# Patient Record
Sex: FEMALE | Race: BLACK OR AFRICAN AMERICAN | NOT HISPANIC OR LATINO | Employment: PART TIME | URBAN - METROPOLITAN AREA
[De-identification: names, ages, dates, MRNs, and addresses within clinical notes are randomized per-mention and may not be internally consistent; named-entity substitution may affect disease eponyms.]

---

## 2023-02-27 LAB
ALANINE AMINOTRANSFERASE (SGPT) (U/L) IN SER/PLAS: 11 U/L (ref 7–45)
ALBUMIN (G/DL) IN SER/PLAS: 4.4 G/DL (ref 3.4–5)
ALKALINE PHOSPHATASE (U/L) IN SER/PLAS: 57 U/L (ref 33–136)
ANION GAP IN SER/PLAS: 13 MMOL/L (ref 10–20)
ASPARTATE AMINOTRANSFERASE (SGOT) (U/L) IN SER/PLAS: 16 U/L (ref 9–39)
BASOPHILS (10*3/UL) IN BLOOD BY AUTOMATED COUNT: 0.04 X10E9/L (ref 0–0.1)
BASOPHILS/100 LEUKOCYTES IN BLOOD BY AUTOMATED COUNT: 0.6 % (ref 0–2)
BILIRUBIN TOTAL (MG/DL) IN SER/PLAS: 0.4 MG/DL (ref 0–1.2)
CALCIUM (MG/DL) IN SER/PLAS: 10.3 MG/DL (ref 8.6–10.6)
CARBON DIOXIDE, TOTAL (MMOL/L) IN SER/PLAS: 27 MMOL/L (ref 21–32)
CHLORIDE (MMOL/L) IN SER/PLAS: 106 MMOL/L (ref 98–107)
CREATININE (MG/DL) IN SER/PLAS: 0.84 MG/DL (ref 0.5–1.05)
EOSINOPHILS (10*3/UL) IN BLOOD BY AUTOMATED COUNT: 0.12 X10E9/L (ref 0–0.4)
EOSINOPHILS/100 LEUKOCYTES IN BLOOD BY AUTOMATED COUNT: 1.8 % (ref 0–6)
ERYTHROCYTE DISTRIBUTION WIDTH (RATIO) BY AUTOMATED COUNT: 14.7 % (ref 11.5–14.5)
ERYTHROCYTE MEAN CORPUSCULAR HEMOGLOBIN CONCENTRATION (G/DL) BY AUTOMATED: 32 G/DL (ref 32–36)
ERYTHROCYTE MEAN CORPUSCULAR VOLUME (FL) BY AUTOMATED COUNT: 89 FL (ref 80–100)
ERYTHROCYTES (10*6/UL) IN BLOOD BY AUTOMATED COUNT: 4.49 X10E12/L (ref 4–5.2)
GFR FEMALE: 74 ML/MIN/1.73M2
GLUCOSE (MG/DL) IN SER/PLAS: 87 MG/DL (ref 74–99)
HEMATOCRIT (%) IN BLOOD BY AUTOMATED COUNT: 40 % (ref 36–46)
HEMOGLOBIN (G/DL) IN BLOOD: 12.8 G/DL (ref 12–16)
IMMATURE GRANULOCYTES/100 LEUKOCYTES IN BLOOD BY AUTOMATED COUNT: 0.3 % (ref 0–0.9)
LEUKOCYTES (10*3/UL) IN BLOOD BY AUTOMATED COUNT: 6.8 X10E9/L (ref 4.4–11.3)
LYMPHOCYTES (10*3/UL) IN BLOOD BY AUTOMATED COUNT: 1.42 X10E9/L (ref 0.8–3)
LYMPHOCYTES/100 LEUKOCYTES IN BLOOD BY AUTOMATED COUNT: 20.9 % (ref 13–44)
MONOCYTES (10*3/UL) IN BLOOD BY AUTOMATED COUNT: 0.46 X10E9/L (ref 0.05–0.8)
MONOCYTES/100 LEUKOCYTES IN BLOOD BY AUTOMATED COUNT: 6.8 % (ref 2–10)
NATRIURETIC PEPTIDE B (PG/ML) IN SER/PLAS: 30 PG/ML (ref 0–99)
NEUTROPHILS (10*3/UL) IN BLOOD BY AUTOMATED COUNT: 4.75 X10E9/L (ref 1.6–5.5)
NEUTROPHILS/100 LEUKOCYTES IN BLOOD BY AUTOMATED COUNT: 69.6 % (ref 40–80)
NRBC (PER 100 WBCS) BY AUTOMATED COUNT: 0 /100 WBC (ref 0–0)
PLATELETS (10*3/UL) IN BLOOD AUTOMATED COUNT: 235 X10E9/L (ref 150–450)
POTASSIUM (MMOL/L) IN SER/PLAS: 4.1 MMOL/L (ref 3.5–5.3)
PROTEIN TOTAL: 7.5 G/DL (ref 6.4–8.2)
SODIUM (MMOL/L) IN SER/PLAS: 142 MMOL/L (ref 136–145)
UREA NITROGEN (MG/DL) IN SER/PLAS: 13 MG/DL (ref 6–23)

## 2023-04-11 LAB
CHOLESTEROL (MG/DL) IN SER/PLAS: 157 MG/DL (ref 0–199)
CHOLESTEROL IN HDL (MG/DL) IN SER/PLAS: 42.7 MG/DL
CHOLESTEROL/HDL RATIO: 3.7
LDL: 94 MG/DL (ref 0–99)
TRIGLYCERIDE (MG/DL) IN SER/PLAS: 102 MG/DL (ref 0–149)
VLDL: 20 MG/DL (ref 0–40)

## 2023-05-17 ENCOUNTER — APPOINTMENT (OUTPATIENT)
Dept: LAB | Facility: LAB | Age: 73
End: 2023-05-17
Payer: MEDICARE

## 2023-05-17 LAB
ALANINE AMINOTRANSFERASE (SGPT) (U/L) IN SER/PLAS: 13 U/L (ref 7–45)
ALBUMIN (G/DL) IN SER/PLAS: 4.3 G/DL (ref 3.4–5)
ALKALINE PHOSPHATASE (U/L) IN SER/PLAS: 58 U/L (ref 33–136)
ANION GAP IN SER/PLAS: 13 MMOL/L (ref 10–20)
ASPARTATE AMINOTRANSFERASE (SGOT) (U/L) IN SER/PLAS: 17 U/L (ref 9–39)
BASOPHILS (10*3/UL) IN BLOOD BY AUTOMATED COUNT: 0.03 X10E9/L (ref 0–0.1)
BASOPHILS/100 LEUKOCYTES IN BLOOD BY AUTOMATED COUNT: 0.4 % (ref 0–2)
BILIRUBIN TOTAL (MG/DL) IN SER/PLAS: 0.3 MG/DL (ref 0–1.2)
CALCIUM (MG/DL) IN SER/PLAS: 9.5 MG/DL (ref 8.6–10.6)
CARBON DIOXIDE, TOTAL (MMOL/L) IN SER/PLAS: 27 MMOL/L (ref 21–32)
CHLORIDE (MMOL/L) IN SER/PLAS: 107 MMOL/L (ref 98–107)
CREATININE (MG/DL) IN SER/PLAS: 0.82 MG/DL (ref 0.5–1.05)
EOSINOPHILS (10*3/UL) IN BLOOD BY AUTOMATED COUNT: 0.16 X10E9/L (ref 0–0.4)
EOSINOPHILS/100 LEUKOCYTES IN BLOOD BY AUTOMATED COUNT: 2.3 % (ref 0–6)
ERYTHROCYTE DISTRIBUTION WIDTH (RATIO) BY AUTOMATED COUNT: 15.3 % (ref 11.5–14.5)
ERYTHROCYTE MEAN CORPUSCULAR HEMOGLOBIN CONCENTRATION (G/DL) BY AUTOMATED: 31.9 G/DL (ref 32–36)
ERYTHROCYTE MEAN CORPUSCULAR VOLUME (FL) BY AUTOMATED COUNT: 90 FL (ref 80–100)
ERYTHROCYTES (10*6/UL) IN BLOOD BY AUTOMATED COUNT: 4.32 X10E12/L (ref 4–5.2)
GFR FEMALE: 76 ML/MIN/1.73M2
GLUCOSE (MG/DL) IN SER/PLAS: 93 MG/DL (ref 74–99)
HEMATOCRIT (%) IN BLOOD BY AUTOMATED COUNT: 38.9 % (ref 36–46)
HEMOGLOBIN (G/DL) IN BLOOD: 12.4 G/DL (ref 12–16)
IMMATURE GRANULOCYTES/100 LEUKOCYTES IN BLOOD BY AUTOMATED COUNT: 0.3 % (ref 0–0.9)
LEUKOCYTES (10*3/UL) IN BLOOD BY AUTOMATED COUNT: 7 X10E9/L (ref 4.4–11.3)
LYMPHOCYTES (10*3/UL) IN BLOOD BY AUTOMATED COUNT: 1.74 X10E9/L (ref 0.8–3)
LYMPHOCYTES/100 LEUKOCYTES IN BLOOD BY AUTOMATED COUNT: 24.9 % (ref 13–44)
MONOCYTES (10*3/UL) IN BLOOD BY AUTOMATED COUNT: 0.43 X10E9/L (ref 0.05–0.8)
MONOCYTES/100 LEUKOCYTES IN BLOOD BY AUTOMATED COUNT: 6.2 % (ref 2–10)
NATRIURETIC PEPTIDE B (PG/ML) IN SER/PLAS: 24 PG/ML (ref 0–99)
NEUTROPHILS (10*3/UL) IN BLOOD BY AUTOMATED COUNT: 4.61 X10E9/L (ref 1.6–5.5)
NEUTROPHILS/100 LEUKOCYTES IN BLOOD BY AUTOMATED COUNT: 65.9 % (ref 40–80)
NRBC (PER 100 WBCS) BY AUTOMATED COUNT: 0 /100 WBC (ref 0–0)
PLATELETS (10*3/UL) IN BLOOD AUTOMATED COUNT: 229 X10E9/L (ref 150–450)
POTASSIUM (MMOL/L) IN SER/PLAS: 4 MMOL/L (ref 3.5–5.3)
PROTEIN TOTAL: 7.6 G/DL (ref 6.4–8.2)
SODIUM (MMOL/L) IN SER/PLAS: 143 MMOL/L (ref 136–145)
UREA NITROGEN (MG/DL) IN SER/PLAS: 15 MG/DL (ref 6–23)

## 2023-08-28 ENCOUNTER — LAB (OUTPATIENT)
Dept: LAB | Facility: LAB | Age: 73
End: 2023-08-28
Payer: MEDICARE

## 2023-08-28 LAB
ALANINE AMINOTRANSFERASE (SGPT) (U/L) IN SER/PLAS: 12 U/L (ref 7–45)
ALBUMIN (G/DL) IN SER/PLAS: 4.5 G/DL (ref 3.4–5)
ALKALINE PHOSPHATASE (U/L) IN SER/PLAS: 59 U/L (ref 33–136)
ANION GAP IN SER/PLAS: 13 MMOL/L (ref 10–20)
ASPARTATE AMINOTRANSFERASE (SGOT) (U/L) IN SER/PLAS: 17 U/L (ref 9–39)
BILIRUBIN TOTAL (MG/DL) IN SER/PLAS: 0.4 MG/DL (ref 0–1.2)
CALCIUM (MG/DL) IN SER/PLAS: 9.7 MG/DL (ref 8.6–10.6)
CARBON DIOXIDE, TOTAL (MMOL/L) IN SER/PLAS: 25 MMOL/L (ref 21–32)
CHLORIDE (MMOL/L) IN SER/PLAS: 106 MMOL/L (ref 98–107)
CREATININE (MG/DL) IN SER/PLAS: 0.8 MG/DL (ref 0.5–1.05)
ERYTHROCYTE DISTRIBUTION WIDTH (RATIO) BY AUTOMATED COUNT: 15.2 % (ref 11.5–14.5)
ERYTHROCYTE MEAN CORPUSCULAR HEMOGLOBIN CONCENTRATION (G/DL) BY AUTOMATED: 31.3 G/DL (ref 32–36)
ERYTHROCYTE MEAN CORPUSCULAR VOLUME (FL) BY AUTOMATED COUNT: 90 FL (ref 80–100)
ERYTHROCYTES (10*6/UL) IN BLOOD BY AUTOMATED COUNT: 4.47 X10E12/L (ref 4–5.2)
GFR FEMALE: 78 ML/MIN/1.73M2
GLUCOSE (MG/DL) IN SER/PLAS: 85 MG/DL (ref 74–99)
HEMATOCRIT (%) IN BLOOD BY AUTOMATED COUNT: 40.2 % (ref 36–46)
HEMOGLOBIN (G/DL) IN BLOOD: 12.6 G/DL (ref 12–16)
LEUKOCYTES (10*3/UL) IN BLOOD BY AUTOMATED COUNT: 6.8 X10E9/L (ref 4.4–11.3)
NATRIURETIC PEPTIDE B (PG/ML) IN SER/PLAS: 32 PG/ML (ref 0–99)
NRBC (PER 100 WBCS) BY AUTOMATED COUNT: 0 /100 WBC (ref 0–0)
PLATELETS (10*3/UL) IN BLOOD AUTOMATED COUNT: 225 X10E9/L (ref 150–450)
POTASSIUM (MMOL/L) IN SER/PLAS: 4.4 MMOL/L (ref 3.5–5.3)
PROTEIN TOTAL: 7.7 G/DL (ref 6.4–8.2)
SODIUM (MMOL/L) IN SER/PLAS: 140 MMOL/L (ref 136–145)
UREA NITROGEN (MG/DL) IN SER/PLAS: 12 MG/DL (ref 6–23)

## 2024-01-05 RX ORDER — UBIDECARENONE 30 MG
1 CAPSULE ORAL DAILY
COMMUNITY

## 2024-01-05 RX ORDER — ASPIRIN 81 MG/1
1 TABLET ORAL DAILY
COMMUNITY
Start: 2017-07-20

## 2024-01-05 RX ORDER — FLUTICASONE PROPIONATE 50 MCG
SPRAY, SUSPENSION (ML) NASAL
COMMUNITY

## 2024-01-05 RX ORDER — MACITENTAN 10 MG/1
1 TABLET, FILM COATED ORAL DAILY
COMMUNITY
Start: 2022-04-12

## 2024-01-05 RX ORDER — TADALAFIL 20 MG/1
20 TABLET ORAL
COMMUNITY

## 2024-01-05 RX ORDER — ASCORBIC ACID 250 MG
1 TABLET ORAL DAILY
COMMUNITY
Start: 2021-03-11

## 2024-01-05 RX ORDER — CLOPIDOGREL BISULFATE 75 MG/1
75 TABLET ORAL
COMMUNITY
End: 2024-01-29

## 2024-01-05 RX ORDER — LORATADINE 10 MG/1
1 TABLET ORAL DAILY PRN
COMMUNITY

## 2024-01-05 RX ORDER — PSEUDOEPHEDRINE HCL 120 MG/1
TABLET, FILM COATED, EXTENDED RELEASE ORAL EVERY 12 HOURS
COMMUNITY
Start: 2022-04-12

## 2024-01-05 RX ORDER — PANTOPRAZOLE SODIUM 40 MG/1
40 TABLET, DELAYED RELEASE ORAL
COMMUNITY
End: 2024-03-11

## 2024-01-05 RX ORDER — UBIDECARENONE 75 MG
1 CAPSULE ORAL DAILY
COMMUNITY

## 2024-01-05 RX ORDER — PRAVASTATIN SODIUM 40 MG/1
40 TABLET ORAL
COMMUNITY
End: 2024-02-14 | Stop reason: SDUPTHER

## 2024-01-05 NOTE — PROGRESS NOTES
History Of Present Illness  Jessica Umaña is a 73 y.o. female presenting with  pulmonary arterial hypertension follow up. She was last seen in clinic 8/28/23. Patient was referred by Doctor Heidi Kraus in 2017. No new medical issues or admission since her last visit. Walking 60 minutes on treadmill 5 days a week.     PAH Treatment: Tadalafil, Opsumit, and ordered oxygen 3LPM at rest and at night.. Wearing 4LPM with ambulation.  No edema , no complaints, feels well   Infusion site: N/A  Treatment history: N/A    01/05/24 Testing today includes 6MWT and labs.     Past Medical History  There is no problem list on file for this patient.       Surgical History  She has a past surgical history that includes Hysterectomy (06/07/2017).     Social History  She has no history on file for tobacco use, alcohol use, and drug use.    Family History  No family history on file.     Medications      Current Outpatient Medications:     ascorbic acid (Vitamin C) 250 mg tablet, Take 1 tablet (250 mg) by mouth once daily., Disp: , Rfl:     aspirin 81 mg EC tablet, Take 1 tablet (81 mg) by mouth once daily., Disp: , Rfl:     Opsumit 10 mg tablet tablet, Take 1 tablet (10 mg) by mouth once daily., Disp: , Rfl:     pseudoephedrine ER (Sudafed 12 Hour) 120 mg 12 hr tablet, Take by mouth every 12 hours., Disp: , Rfl:     clopidogrel (Plavix) 75 mg tablet, Take 1 tablet (75 mg) by mouth once daily., Disp: , Rfl:     cyanocobalamin (Vitamin B-12) 500 mcg tablet, Take 1 tablet (500 mcg) by mouth once daily., Disp: , Rfl:     fluticasone (Flonase) 50 mcg/actuation nasal spray, Administer into affected nostril(s) once daily., Disp: , Rfl:     loratadine (Claritin) 10 mg tablet, Take 1 tablet (10 mg) by mouth once daily as needed., Disp: , Rfl:     mv-calcium-min-iron fm-FA-vitK (Multi For Her) 18 mg iron-600 mcg-80 mcg tablet, Take 1 tablet by mouth once daily., Disp: , Rfl:     pantoprazole (ProtoNix) 40 mg EC tablet, Take 1 tablet (40 mg) by  mouth once daily., Disp: , Rfl:     pravastatin (Pravachol) 40 mg tablet, Take 1 tablet (40 mg) by mouth once daily., Disp: , Rfl:     tadalafil 20 mg tablet, Take 1 tablet (20 mg) by mouth once daily., Disp: , Rfl:      Allergies  Patient has no allergy information on record.    Review of Systems   Constitutional:  Negative for appetite change and fatigue.   Respiratory:  Positive for shortness of breath. Negative for chest tightness.         SOB on exertion   Cardiovascular:  Negative for chest pain and leg swelling.   Neurological:  Negative for dizziness, syncope and light-headedness.   Psychiatric/Behavioral:  Negative for sleep disturbance.        Last Recorded Vitals  There were no vitals taken for this visit.     Physical Exam  Constitutional:       Comments: Wheelchair and oxygen.   HENT:      Head: Normocephalic.      Nose: Nose normal.      Mouth/Throat:      Mouth: Mucous membranes are moist.   Eyes:      Pupils: Pupils are equal, round, and reactive to light.   Cardiovascular:      Rate and Rhythm: Normal rate and regular rhythm.   Pulmonary:      Effort: Pulmonary effort is normal.      Breath sounds: Normal breath sounds.   Abdominal:      General: Bowel sounds are normal.      Palpations: Abdomen is soft.   Musculoskeletal:      Cervical back: Normal range of motion.      Right lower leg: No edema.      Left lower leg: No edema.   Skin:     Findings: No rash.   Neurological:      Mental Status: She is alert.   Psychiatric:         Mood and Affect: Mood normal.            Relevant Results      6MWT ( 2024)  SpO2: 95-84% 6LPM  HR:   More: 0-6  Actual meters: 448        Echo (2023)  The right ventricle is mildly enlarged. There is low normal right ventricular systolic function.  The right atrium is normal in size.      6MWT (2023)  SpO2: 97-88% 6LPM  HR:   More: 0-4  Actual meters: 442      6MWT (2023)  SpO2: 97-85% 6LPM  HR:   More: 0-5  Actual meters: 454     6MWT  "23  HR 64 - 125  Spo2 99 - 88 on 6LPM  More 0 - 5  Actual 428m / 393m predicted      Echo (23): normal size RV with normal function, normal size RA      RHC (2021)   PAp 41/16 (26)   PW 9   C.O. 6.5 / C.I. 3.5     Assessment/Plan     I have reviewed relevant data prior to patient visit includinMW and echo essentially unchanged compared to last.         1) Pulmonary   a. IPAH FC i - II on dual oral therapies. and improving and significant objective and subjective improvement. Walk distance Well over presentation and stable. Last echo  today with suboptimal RV views but likely unchaged from last..Cath 2018, mPAP =27 mm Hg. Feels \"wonderful\". Echo 2020 normal RV by my eye. Due for cath this year. Cath 2021 mPAP = 26 mm Hg. Echo 2023 with \"normal RV\", I disagree with that, at least mild + but stable, echo 2023 same. SpO2 on room air rest = 96%     2023 - Walk (+) 26m, desat to 85% on 6L/min    2024 - Walk stable , no change, no interval medical issues.     2) (+) GLADYS without evidence of systemic disease.     3) Endocrine  a. diabetes  b. Obesity , BMI =33.4 -34.9 -> 33.29      4) Cardiac - stented CAD with some benefit.       Plan     1) Oxygen at 5-6L/min with slow ambulation. Continue sleep   O2, no oxygen needed awake at rest.  2) Continue macitentan and Adcirca.  3) Follow up approximately 3-4 month follow up with 6 MW, cath in .   5) Weight loss.  6) Check CBC, chem complete, BNP to monitor disease progression and long term use of meds.             "

## 2024-01-17 ENCOUNTER — LAB (OUTPATIENT)
Dept: LAB | Facility: LAB | Age: 74
End: 2024-01-17
Payer: MEDICARE

## 2024-01-17 ENCOUNTER — OFFICE VISIT (OUTPATIENT)
Dept: PULMONOLOGY | Facility: HOSPITAL | Age: 74
End: 2024-01-17
Payer: MEDICARE

## 2024-01-17 ENCOUNTER — HOSPITAL ENCOUNTER (OUTPATIENT)
Dept: RESPIRATORY THERAPY | Facility: HOSPITAL | Age: 74
Discharge: HOME | End: 2024-01-17
Payer: MEDICARE

## 2024-01-17 VITALS
SYSTOLIC BLOOD PRESSURE: 120 MMHG | HEART RATE: 69 BPM | BODY MASS INDEX: 33.03 KG/M2 | OXYGEN SATURATION: 94 % | DIASTOLIC BLOOD PRESSURE: 79 MMHG | WEIGHT: 180.6 LBS

## 2024-01-17 DIAGNOSIS — R06.02 SHORTNESS OF BREATH: ICD-10-CM

## 2024-01-17 DIAGNOSIS — I27.20 PULMONARY HYPERTENSION (MULTI): ICD-10-CM

## 2024-01-17 LAB
ANION GAP SERPL CALC-SCNC: 13 MMOL/L (ref 10–20)
BASOPHILS # BLD AUTO: 0.03 X10*3/UL (ref 0–0.1)
BASOPHILS NFR BLD AUTO: 0.4 %
BNP SERPL-MCNC: 21 PG/ML (ref 0–99)
BUN SERPL-MCNC: 12 MG/DL (ref 6–23)
CALCIUM SERPL-MCNC: 10.3 MG/DL (ref 8.6–10.6)
CHLORIDE SERPL-SCNC: 105 MMOL/L (ref 98–107)
CO2 SERPL-SCNC: 28 MMOL/L (ref 21–32)
CREAT SERPL-MCNC: 0.9 MG/DL (ref 0.5–1.05)
EGFRCR SERPLBLD CKD-EPI 2021: 68 ML/MIN/1.73M*2
EOSINOPHIL # BLD AUTO: 0.14 X10*3/UL (ref 0–0.4)
EOSINOPHIL NFR BLD AUTO: 2 %
ERYTHROCYTE [DISTWIDTH] IN BLOOD BY AUTOMATED COUNT: 14.9 % (ref 11.5–14.5)
GLUCOSE SERPL-MCNC: 84 MG/DL (ref 74–99)
HCT VFR BLD AUTO: 41.2 % (ref 36–46)
HGB BLD-MCNC: 12.9 G/DL (ref 12–16)
IMM GRANULOCYTES # BLD AUTO: 0.01 X10*3/UL (ref 0–0.5)
IMM GRANULOCYTES NFR BLD AUTO: 0.1 % (ref 0–0.9)
LYMPHOCYTES # BLD AUTO: 1.53 X10*3/UL (ref 0.8–3)
LYMPHOCYTES NFR BLD AUTO: 21.6 %
MCH RBC QN AUTO: 28.1 PG (ref 26–34)
MCHC RBC AUTO-ENTMCNC: 31.3 G/DL (ref 32–36)
MCV RBC AUTO: 90 FL (ref 80–100)
MONOCYTES # BLD AUTO: 0.39 X10*3/UL (ref 0.05–0.8)
MONOCYTES NFR BLD AUTO: 5.5 %
NEUTROPHILS # BLD AUTO: 4.99 X10*3/UL (ref 1.6–5.5)
NEUTROPHILS NFR BLD AUTO: 70.4 %
NRBC BLD-RTO: 0 /100 WBCS (ref 0–0)
PLATELET # BLD AUTO: 245 X10*3/UL (ref 150–450)
POTASSIUM SERPL-SCNC: 4.3 MMOL/L (ref 3.5–5.3)
RBC # BLD AUTO: 4.59 X10*6/UL (ref 4–5.2)
SODIUM SERPL-SCNC: 142 MMOL/L (ref 136–145)
WBC # BLD AUTO: 7.1 X10*3/UL (ref 4.4–11.3)

## 2024-01-17 PROCEDURE — 80048 BASIC METABOLIC PNL TOTAL CA: CPT

## 2024-01-17 PROCEDURE — 83880 ASSAY OF NATRIURETIC PEPTIDE: CPT

## 2024-01-17 PROCEDURE — 1126F AMNT PAIN NOTED NONE PRSNT: CPT | Performed by: INTERNAL MEDICINE

## 2024-01-17 PROCEDURE — 85025 COMPLETE CBC W/AUTO DIFF WBC: CPT

## 2024-01-17 PROCEDURE — 36415 COLL VENOUS BLD VENIPUNCTURE: CPT

## 2024-01-17 PROCEDURE — 99215 OFFICE O/P EST HI 40 MIN: CPT | Performed by: INTERNAL MEDICINE

## 2024-01-17 PROCEDURE — 1159F MED LIST DOCD IN RCRD: CPT | Performed by: INTERNAL MEDICINE

## 2024-01-17 PROCEDURE — 94618 PULMONARY STRESS TESTING: CPT | Performed by: INTERNAL MEDICINE

## 2024-01-17 RX ORDER — EVOLOCUMAB 140 MG/ML
INJECTION, SOLUTION SUBCUTANEOUS
COMMUNITY
End: 2024-04-09 | Stop reason: ENTERED-IN-ERROR

## 2024-01-17 RX ORDER — CALCIUM CARBONATE 500(1250)
TABLET ORAL EVERY 24 HOURS
COMMUNITY

## 2024-01-17 ASSESSMENT — ENCOUNTER SYMPTOMS
SHORTNESS OF BREATH: 1
CHEST TIGHTNESS: 0
LIGHT-HEADEDNESS: 0
SLEEP DISTURBANCE: 0
FATIGUE: 0
DIZZINESS: 0
APPETITE CHANGE: 0

## 2024-01-28 DIAGNOSIS — I25.10 CORONARY ARTERY DISEASE INVOLVING NATIVE HEART WITHOUT ANGINA PECTORIS, UNSPECIFIED VESSEL OR LESION TYPE: Primary | ICD-10-CM

## 2024-01-29 RX ORDER — CLOPIDOGREL BISULFATE 75 MG/1
75 TABLET ORAL DAILY
Qty: 90 TABLET | Refills: 1 | Status: SHIPPED | OUTPATIENT
Start: 2024-01-29

## 2024-02-14 DIAGNOSIS — E78.5 HYPERLIPIDEMIA, UNSPECIFIED HYPERLIPIDEMIA TYPE: Primary | ICD-10-CM

## 2024-02-14 RX ORDER — PRAVASTATIN SODIUM 40 MG/1
40 TABLET ORAL NIGHTLY
Qty: 90 TABLET | Refills: 1 | Status: SHIPPED | OUTPATIENT
Start: 2024-02-14

## 2024-04-08 PROBLEM — I25.10 CAD (CORONARY ARTERY DISEASE): Status: ACTIVE | Noted: 2024-04-08

## 2024-04-09 ENCOUNTER — OFFICE VISIT (OUTPATIENT)
Dept: CARDIOLOGY | Facility: HOSPITAL | Age: 74
End: 2024-04-09
Payer: MEDICARE

## 2024-04-09 VITALS
DIASTOLIC BLOOD PRESSURE: 91 MMHG | HEIGHT: 62 IN | BODY MASS INDEX: 34.19 KG/M2 | WEIGHT: 185.8 LBS | SYSTOLIC BLOOD PRESSURE: 142 MMHG | OXYGEN SATURATION: 91 % | HEART RATE: 72 BPM

## 2024-04-09 DIAGNOSIS — I25.10 CORONARY ARTERY DISEASE INVOLVING NATIVE HEART, UNSPECIFIED VESSEL OR LESION TYPE, UNSPECIFIED WHETHER ANGINA PRESENT: Primary | ICD-10-CM

## 2024-04-09 PROCEDURE — 1159F MED LIST DOCD IN RCRD: CPT | Performed by: INTERNAL MEDICINE

## 2024-04-09 PROCEDURE — 1126F AMNT PAIN NOTED NONE PRSNT: CPT | Performed by: INTERNAL MEDICINE

## 2024-04-09 PROCEDURE — 1036F TOBACCO NON-USER: CPT | Performed by: INTERNAL MEDICINE

## 2024-04-09 PROCEDURE — 93010 ELECTROCARDIOGRAM REPORT: CPT | Performed by: INTERNAL MEDICINE

## 2024-04-09 PROCEDURE — 93005 ELECTROCARDIOGRAM TRACING: CPT | Performed by: INTERNAL MEDICINE

## 2024-04-09 PROCEDURE — 99214 OFFICE O/P EST MOD 30 MIN: CPT | Performed by: INTERNAL MEDICINE

## 2024-04-09 ASSESSMENT — PAIN SCALES - GENERAL: PAINLEVEL: 0-NO PAIN

## 2024-04-09 NOTE — PATIENT INSTRUCTIONS
To reach Dr. Mayer's office please call [phone] ([place]). Fax [phone]. Call [phone] to schedule an appointment. You may also contact the HF RNs at [email or web address]     Thank you for coming to your appointment today. If you have any questions or need cardiac medication refills, please call the Heart Failure Office at [phone] option [phone].   You may also contact the HF RNs at [email or web address]      Please check you blood pressure daily in the morning and afternoon and write these down. Please send these to us in 2-3 weeks.

## 2024-04-09 NOTE — PROGRESS NOTES
"Chief Complaint:   Annual Exam     History Of Present Illness:    Jessica Umaña is a 74 y.o. female presenting for a follow up visit     This is a 73 yr old AA female who presents for follow up of CAD and right heart dysfunction. She has a h/o Idiopathic pulmonary artery hypertension and CAD s/p LAD  PCI 7/28/17.   she continues to do well  She denies hospitalization in the last year.  She denies fatigue, chest pain, palpitations, orthopnea, PND. No edema noted in BLE.   Denies headaches, dizziness, falls.  On 4 Liters of nasal oxygen   Her most recent echocardiogram is listed below   She reports that her SBP is in the 120's at home     August 2023   1. Left ventricular systolic function is normal with a 55-60% estimated ejection fraction.  2. There is low normal right ventricular systolic function.    June 2021    1. Normal cardiac output (CO: 6.5 L/min, CI: 3.5 L/min/M2).   2. Normal fillin pressure (RA pressure 5 mmHG and PW pressure: 9 mmHG).   3. Mild pulmonary hypertension (PA pressure: 41/16 mmHG, mPAP: 26 mmHG).        Hospitalizations: Denies       Last Recorded Vitals:  Vitals:    04/09/24 0946   BP: (!) 142/91   BP Location: Left arm   Patient Position: Sitting   BP Cuff Size: Adult   Pulse: 72   SpO2: 91%   Weight: 84.3 kg (185 lb 12.8 oz)   Height: 1.575 m (5' 2\")       Past Medical History:  She has a past medical history of Personal history of other diseases of the circulatory system (06/07/2017) and Personal history of other diseases of the female genital tract (06/07/2017).    Past Surgical History:  She has a past surgical history that includes Hysterectomy (06/07/2017).      Social History:  She reports that she has never smoked. She has never used smokeless tobacco. She reports that she does not currently use alcohol. She reports that she does not currently use drugs.    Family History:  No family history on file.     Allergies:  Acetaminophen and Penicillins    Outpatient Medications:  Current " Outpatient Medications   Medication Instructions    ascorbic acid (Vitamin C) 250 mg tablet 1 tablet, oral, Daily    aspirin 81 mg EC tablet 1 tablet, oral, Daily    calcium carbonate (Oscal) 500 mg calcium (1,250 mg) tablet Every 24 hours    clopidogrel (PLAVIX) 75 mg, oral, Daily    cyanocobalamin (Vitamin B-12) 500 mcg tablet 1 tablet, oral, Daily    fluticasone (Flonase) 50 mcg/actuation nasal spray nasal, Daily RT    loratadine (Claritin) 10 mg tablet 1 tablet, oral, Daily PRN    mv-calcium-min-iron fm-FA-vitK (Multi For Her) 18 mg iron-600 mcg-80 mcg tablet 1 tablet, oral, Daily    Opsumit 10 mg tablet tablet 1 tablet, oral, Daily    pantoprazole (PROTONIX) 40 mg, oral, Daily    pravastatin (PRAVACHOL) 40 mg, oral, Nightly    pseudoephedrine ER (Sudafed 12 Hour) 120 mg 12 hr tablet oral, Every 12 hours    tadalafil (CIALIS) 20 mg, oral, Daily RT       Physical Exam:  GEN: NAD , AOX3  HEENT : JVP at the clavicle   Heart : regular rhythm , normal S1 and S2 , no murmurs   Lungs : clear , resonant , normal air entry bilaterally   Abdomen : soft , non tender   Ext: warm , well perfused , no edema   Neuro : grossly intact       Last Labs:  CBC -  Lab Results   Component Value Date    WBC 7.1 01/17/2024    HGB 12.9 01/17/2024    HCT 41.2 01/17/2024    MCV 90 01/17/2024     01/17/2024       CMP -  Lab Results   Component Value Date    CALCIUM 10.3 01/17/2024    PROT 7.7 08/28/2023    ALBUMIN 4.5 08/28/2023    AST 17 08/28/2023    ALT 12 08/28/2023    ALKPHOS 59 08/28/2023    BILITOT 0.4 08/28/2023       LIPID PANEL -   Lab Results   Component Value Date    CHOL 157 04/11/2023    TRIG 102 04/11/2023    HDL 42.7 04/11/2023    CHHDL 3.7 04/11/2023    LDLF 94 04/11/2023    VLDL 20 04/11/2023       RENAL FUNCTION PANEL -   Lab Results   Component Value Date    GLUCOSE 84 01/17/2024     01/17/2024    K 4.3 01/17/2024     01/17/2024    CO2 28 01/17/2024    ANIONGAP 13 01/17/2024    BUN 12 01/17/2024     CREATININE 0.90 01/17/2024    CALCIUM 10.3 01/17/2024    ALBUMIN 4.5 08/28/2023        Lab Results   Component Value Date    BNP 21 01/17/2024           Assessment/Plan   73 year old woman with a PMH significant for idiopathic PH , also with CAD s/p PCI to an LAD  in 2017 .   She is here for a routine follow up   stable EF at 55-60% with a low normal RV function   BP is elevated , advised her to check her BP readings twice a day for the next 2-3 weeks and will revie them to decide on the need to start her on antihypertensives      Continue ASA and Plavix lifelong per Dr Hillman        Intolerant to statins secondary to significant myalgias      Yearly echocardiograms and cardiology appointments

## 2024-04-15 LAB
ATRIAL RATE: 58 BPM
P AXIS: 49 DEGREES
P OFFSET: 194 MS
P ONSET: 135 MS
PR INTERVAL: 186 MS
Q ONSET: 228 MS
QRS COUNT: 9 BEATS
QRS DURATION: 80 MS
QT INTERVAL: 424 MS
QTC CALCULATION(BAZETT): 416 MS
QTC FREDERICIA: 419 MS
R AXIS: 70 DEGREES
T AXIS: -16 DEGREES
T OFFSET: 440 MS
VENTRICULAR RATE: 58 BPM

## 2024-04-23 NOTE — PROGRESS NOTES
History Of Present Illness  Jessica Umaña is a 74 y.o. female presenting with  pulmonary arterial hypertension follow up. She was last seen in clinic 1/17/24. Patient was referred by Doctor Heidi rKaus in 2017. No new medical issues or admission since her last visit. Walking 60 minutes on treadmill 5 days a week. Patient is NYHA Functional Class II and WHO Group 1.     PAH Treatment: Tadalafil, Opsumit, and ordered oxygen 3LPM at rest and at night.. Wearing 4LPM with ambulation.  No edema , no complaints, feels well   Infusion site: N/A  Treatment history: N/A        04/23/24     Interval History   Patient presents with son. To clinic. No complaints at this time. Is wearing 4L of O2 via NC. Denies D/N/V/edema/HA/diarrhea/flushing/neuropathy. Patient confirms taking macitentan and tadalafil as prescribed.     Past Medical History  Patient Active Problem List   Diagnosis    CAD (coronary artery disease)        Surgical History  She has a past surgical history that includes Hysterectomy (06/07/2017).     Social History  She reports that she has never smoked. She has never used smokeless tobacco. She reports that she does not currently use alcohol. She reports that she does not currently use drugs.    Family History  No family history on file.     Medications      Current Outpatient Medications:     ascorbic acid (Vitamin C) 250 mg tablet, Take 1 tablet (250 mg) by mouth once daily., Disp: , Rfl:     aspirin 81 mg EC tablet, Take 1 tablet (81 mg) by mouth once daily., Disp: , Rfl:     calcium carbonate (Oscal) 500 mg calcium (1,250 mg) tablet, once every 24 hours., Disp: , Rfl:     clopidogrel (Plavix) 75 mg tablet, TAKE 1 TABLET EVERY DAY, Disp: 90 tablet, Rfl: 1    cyanocobalamin (Vitamin B-12) 500 mcg tablet, Take 1 tablet (500 mcg) by mouth once daily., Disp: , Rfl:     fluticasone (Flonase) 50 mcg/actuation nasal spray, Administer into affected nostril(s) once daily., Disp: , Rfl:     loratadine (Claritin) 10 mg  tablet, Take 1 tablet (10 mg) by mouth once daily as needed., Disp: , Rfl:     mv-calcium-min-iron fm-FA-vitK (Multi For Her) 18 mg iron-600 mcg-80 mcg tablet, Take 1 tablet by mouth once daily., Disp: , Rfl:     Opsumit 10 mg tablet tablet, Take 1 tablet (10 mg) by mouth once daily., Disp: , Rfl:     pantoprazole (ProtoNix) 40 mg EC tablet, TAKE 1 TABLET EVERY DAY, Disp: 90 tablet, Rfl: 3    pravastatin (Pravachol) 40 mg tablet, TAKE 1 TABLET AT BEDTIME, Disp: 90 tablet, Rfl: 1    pseudoephedrine ER (Sudafed 12 Hour) 120 mg 12 hr tablet, Take by mouth every 12 hours., Disp: , Rfl:     tadalafil 20 mg tablet, Take 1 tablet (20 mg) by mouth once daily., Disp: , Rfl:      Allergies  Acetaminophen and Penicillins    Review of Systems   Constitutional:  Negative for appetite change, chills and fever.   Respiratory:  Negative for cough, chest tightness, shortness of breath and wheezing.    Cardiovascular:  Negative for chest pain, palpitations and leg swelling.   Gastrointestinal:  Negative for abdominal distention, abdominal pain, constipation, diarrhea, nausea and vomiting.   Neurological:  Negative for dizziness, syncope, weakness, light-headedness and headaches.       Last Recorded Vitals    Vitals:    04/25/24 1349   BP: 111/72   Pulse: 58   SpO2: 98%         Physical Exam   Constitutional:       Comments: Wheelchair and oxygen.   HENT:      Head: Normocephalic.      Nose: Nose normal.      Mouth/Throat:      Mouth: Mucous membranes are moist.   Eyes:      Pupils: Pupils are equal, round, and reactive to light.   Cardiovascular:      Rate and Rhythm: Normal rate and regular rhythm.   Pulmonary:      Effort: Pulmonary effort is normal.      Breath sounds: Normal breath sounds.   Abdominal:      General: Bowel sounds are normal.      Palpations: Abdomen is soft.   Musculoskeletal:      Cervical back: Normal range of motion.      Right lower leg: No edema.      Left lower leg: No edema.   Skin:     Findings: No rash.  "  Neurological:      Mental Status: She is alert.   Psychiatric:         Mood and Affect: Mood normal.     Relevant Results    6MWT (2024)  SpO2: 99/87 6L  HR: 55/129  More: 0/6  Actual meters: 459    6MWT ( 2024)  SpO2: 95-84% 6LPM  HR:   More: 0-6  Actual meters: 448    6MWT (2023)  SpO2: 97-88% 6LPM  HR:   More: 0-4  Actual meters: 442     6MWT (2023)  SpO2: 97-85% 6LPM  HR:   More: 0-5  Actual meters: 454     6MWT 23  HR 64 - 125  Spo2 99 - 88 on 6LPM  More 0 - 5  Actual 428m / 393m predicted      Echo (2024)      Echo (2023)   The right ventricle is mildly enlarged. There is low normal right ventricular systolic function.  The right atrium is normal in size.        Echo (23):   normal size RV with normal function,   normal size RA     RHC (2021)  PAp 41/16 (26)  PW 9  C.O. 6.5 / C.I. 3.5        Assessment/Plan   1) Pulmonary   a. IPAH FC i - II on dual oral therapies. and improving and significant objective and subjective improvement. Walk distance Well over presentation and stable. Last echo  today with suboptimal RV views but likely unchaged from last..Cath 2018, mPAP =27 mm Hg. Feels \"wonderful\". Echo 2020 normal RV by my eye. Due for cath this year. Cath 2021 mPAP = 26 mm Hg. Echo 2023 with \"normal RV\", I disagree with that, at least mild + but stable, echo 2023 same. SpO2 on room air rest = 98% 2024 echo RV still > normal by my eye. Walk and subjective function stable.     2023 - Walk (+) 26m, desat to 85% on 6L/min     2024 - Walk stable , no change, no interval medical issues.     2) (+) GLADYS without evidence of systemic disease.     3) Endocrine  a. diabetes  b. Obesity , BMI =33.4 -34.9 -> 33.29      4) Cardiac - stented CAD with some benefit.        Plan  1) Oxygen at 5-6L/min with slow ambulation. Continue sleep   O2, no oxygen needed awake at rest.  2) Continue macitentan and Adcirca.  3) Follow " up approximately 3-4 month follow up with 6 MW, cath before seen.   5) Weight loss.  6) Check CBC, chem complete, BNP to monitor disease progression and long term use of meds.

## 2024-04-25 ENCOUNTER — HOSPITAL ENCOUNTER (OUTPATIENT)
Dept: CARDIOLOGY | Facility: HOSPITAL | Age: 74
Discharge: HOME | End: 2024-04-25
Payer: MEDICARE

## 2024-04-25 ENCOUNTER — HOSPITAL ENCOUNTER (OUTPATIENT)
Dept: RESPIRATORY THERAPY | Facility: HOSPITAL | Age: 74
Discharge: HOME | End: 2024-04-25
Payer: MEDICARE

## 2024-04-25 ENCOUNTER — LAB (OUTPATIENT)
Dept: LAB | Facility: LAB | Age: 74
End: 2024-04-25
Payer: MEDICARE

## 2024-04-25 ENCOUNTER — OFFICE VISIT (OUTPATIENT)
Dept: PULMONOLOGY | Facility: HOSPITAL | Age: 74
End: 2024-04-25
Payer: MEDICARE

## 2024-04-25 VITALS
WEIGHT: 181 LBS | SYSTOLIC BLOOD PRESSURE: 111 MMHG | OXYGEN SATURATION: 98 % | HEART RATE: 58 BPM | HEIGHT: 62 IN | BODY MASS INDEX: 33.31 KG/M2 | DIASTOLIC BLOOD PRESSURE: 72 MMHG

## 2024-04-25 DIAGNOSIS — I27.20 PULMONARY HYPERTENSION (MULTI): ICD-10-CM

## 2024-04-25 DIAGNOSIS — R06.02 SHORTNESS OF BREATH: ICD-10-CM

## 2024-04-25 DIAGNOSIS — R06.02 SOB (SHORTNESS OF BREATH): ICD-10-CM

## 2024-04-25 LAB
AORTIC VALVE PEAK VELOCITY: 1.32 M/S
AV PEAK GRADIENT: 7 MMHG
AVA (PEAK VEL): 2.36 CM2
BASOPHILS # BLD AUTO: 0.03 X10*3/UL (ref 0–0.1)
BASOPHILS NFR BLD AUTO: 0.4 %
BNP SERPL-MCNC: 5 PG/ML (ref 0–99)
EJECTION FRACTION APICAL 4 CHAMBER: 58.6
EOSINOPHIL # BLD AUTO: 0.1 X10*3/UL (ref 0–0.4)
EOSINOPHIL NFR BLD AUTO: 1.5 %
ERYTHROCYTE [DISTWIDTH] IN BLOOD BY AUTOMATED COUNT: 14.8 % (ref 11.5–14.5)
HCT VFR BLD AUTO: 37.7 % (ref 36–46)
HGB BLD-MCNC: 12.6 G/DL (ref 12–16)
IMM GRANULOCYTES # BLD AUTO: 0.01 X10*3/UL (ref 0–0.5)
IMM GRANULOCYTES NFR BLD AUTO: 0.1 % (ref 0–0.9)
LEFT ATRIUM VOLUME AREA LENGTH INDEX BSA: 23.1 ML/M2
LEFT VENTRICLE INTERNAL DIMENSION DIASTOLE: 5 CM (ref 3.5–6)
LEFT VENTRICULAR OUTFLOW TRACT DIAMETER: 2.1 CM
LV EJECTION FRACTION BIPLANE: 58 %
LYMPHOCYTES # BLD AUTO: 1.66 X10*3/UL (ref 0.8–3)
LYMPHOCYTES NFR BLD AUTO: 24.9 %
MCH RBC QN AUTO: 28.8 PG (ref 26–34)
MCHC RBC AUTO-ENTMCNC: 33.4 G/DL (ref 32–36)
MCV RBC AUTO: 86 FL (ref 80–100)
MITRAL VALVE E/A RATIO: 0.7
MITRAL VALVE E/E' RATIO: 9.35
MONOCYTES # BLD AUTO: 0.39 X10*3/UL (ref 0.05–0.8)
MONOCYTES NFR BLD AUTO: 5.8 %
NEUTROPHILS # BLD AUTO: 4.48 X10*3/UL (ref 1.6–5.5)
NEUTROPHILS NFR BLD AUTO: 67.3 %
NRBC BLD-RTO: 0 /100 WBCS (ref 0–0)
PLATELET # BLD AUTO: 234 X10*3/UL (ref 150–450)
RBC # BLD AUTO: 4.37 X10*6/UL (ref 4–5.2)
RIGHT VENTRICLE FREE WALL PEAK S': 10 CM/S
TRICUSPID ANNULAR PLANE SYSTOLIC EXCURSION: 2.1 CM
WBC # BLD AUTO: 6.7 X10*3/UL (ref 4.4–11.3)

## 2024-04-25 PROCEDURE — 99215 OFFICE O/P EST HI 40 MIN: CPT | Performed by: INTERNAL MEDICINE

## 2024-04-25 PROCEDURE — 36415 COLL VENOUS BLD VENIPUNCTURE: CPT

## 2024-04-25 PROCEDURE — 1126F AMNT PAIN NOTED NONE PRSNT: CPT | Performed by: INTERNAL MEDICINE

## 2024-04-25 PROCEDURE — 83880 ASSAY OF NATRIURETIC PEPTIDE: CPT

## 2024-04-25 PROCEDURE — 85025 COMPLETE CBC W/AUTO DIFF WBC: CPT

## 2024-04-25 PROCEDURE — 1036F TOBACCO NON-USER: CPT | Performed by: INTERNAL MEDICINE

## 2024-04-25 PROCEDURE — 94618 PULMONARY STRESS TESTING: CPT | Performed by: INTERNAL MEDICINE

## 2024-04-25 PROCEDURE — 93306 TTE W/DOPPLER COMPLETE: CPT | Performed by: INTERNAL MEDICINE

## 2024-04-25 PROCEDURE — 93306 TTE W/DOPPLER COMPLETE: CPT

## 2024-04-25 PROCEDURE — 94618 PULMONARY STRESS TESTING: CPT

## 2024-04-25 PROCEDURE — 1159F MED LIST DOCD IN RCRD: CPT | Performed by: INTERNAL MEDICINE

## 2024-04-25 ASSESSMENT — ENCOUNTER SYMPTOMS
FEVER: 0
LIGHT-HEADEDNESS: 0
WHEEZING: 0
CHEST TIGHTNESS: 0
DIZZINESS: 0
APPETITE CHANGE: 0
COUGH: 0
SHORTNESS OF BREATH: 0
VOMITING: 0
DIARRHEA: 0
NAUSEA: 0
PALPITATIONS: 0
CONSTIPATION: 0
CHILLS: 0
WEAKNESS: 0
ABDOMINAL PAIN: 0
ABDOMINAL DISTENTION: 0
HEADACHES: 0

## 2024-04-25 ASSESSMENT — PAIN SCALES - GENERAL: PAINLEVEL: 0-NO PAIN

## 2024-04-25 NOTE — PROGRESS NOTES
Jessica Umaña is a 74 y.o. female on day 0 of admission presenting with No Principal Problem: There is no principal problem currently on the Problem List. Please update the Problem List and refresh..    Subjective   ***       Objective     Physical Exam    Last Recorded Vitals  There were no vitals taken for this visit.  Intake/Output last 3 Shifts:  No intake/output data recorded.    Relevant Results  {If you would like to pull in Medications, type .meds     If you would like to pull in Lab results for the last 24 hours, type .[id number]    If you would like to pull in Imaging results, type .imgrslt :99}    {Link to Stroke Scoring tools - Link :99}                       Assessment/Plan   Active Problems:  There are no active Hospital Problems.    ***     {This patient does not have an ACP note on file for this encounter, please fill one out - Advance Care Planning Activity :99}      JEAN CARLOS SILVERMAN, RRT

## 2024-04-26 DIAGNOSIS — I27.20 PULMONARY HYPERTENSION (MULTI): ICD-10-CM

## 2024-07-10 ENCOUNTER — HOSPITAL ENCOUNTER (OUTPATIENT)
Facility: HOSPITAL | Age: 74
Setting detail: OUTPATIENT SURGERY
Discharge: HOME | End: 2024-07-10
Attending: INTERNAL MEDICINE | Admitting: INTERNAL MEDICINE
Payer: MEDICARE

## 2024-07-10 VITALS — WEIGHT: 177 LBS | BODY MASS INDEX: 32.57 KG/M2 | HEIGHT: 62 IN

## 2024-07-10 DIAGNOSIS — I27.20 PULMONARY HYPERTENSION (MULTI): Primary | ICD-10-CM

## 2024-07-10 DIAGNOSIS — I27.0 PRIMARY PULMONARY HYPERTENSION (MULTI): ICD-10-CM

## 2024-07-10 DIAGNOSIS — E78.5 HYPERLIPIDEMIA, UNSPECIFIED HYPERLIPIDEMIA TYPE: ICD-10-CM

## 2024-07-10 DIAGNOSIS — I50.30 DIASTOLIC HEART FAILURE, UNSPECIFIED HF CHRONICITY (MULTI): ICD-10-CM

## 2024-07-10 DIAGNOSIS — I27.20 PULMONARY HYPERTENSION (MULTI): ICD-10-CM

## 2024-07-10 LAB
ANION GAP SERPL CALC-SCNC: 12 MMOL/L (ref 10–20)
BUN SERPL-MCNC: 14 MG/DL (ref 6–23)
CALCIUM SERPL-MCNC: 9.5 MG/DL (ref 8.6–10.6)
CHLORIDE SERPL-SCNC: 105 MMOL/L (ref 98–107)
CO2 SERPL-SCNC: 27 MMOL/L (ref 21–32)
CREAT SERPL-MCNC: 0.9 MG/DL (ref 0.5–1.05)
EGFRCR SERPLBLD CKD-EPI 2021: 68 ML/MIN/1.73M*2
ERYTHROCYTE [DISTWIDTH] IN BLOOD BY AUTOMATED COUNT: 15 % (ref 11.5–14.5)
GLUCOSE SERPL-MCNC: 94 MG/DL (ref 74–99)
HCT VFR BLD AUTO: 39.2 % (ref 36–46)
HGB BLD-MCNC: 12.8 G/DL (ref 12–16)
MCH RBC QN AUTO: 28.8 PG (ref 26–34)
MCHC RBC AUTO-ENTMCNC: 32.7 G/DL (ref 32–36)
MCV RBC AUTO: 88 FL (ref 80–100)
NRBC BLD-RTO: 0 /100 WBCS (ref 0–0)
PLATELET # BLD AUTO: 234 X10*3/UL (ref 150–450)
POTASSIUM SERPL-SCNC: 4 MMOL/L (ref 3.5–5.3)
RBC # BLD AUTO: 4.44 X10*6/UL (ref 4–5.2)
SODIUM SERPL-SCNC: 140 MMOL/L (ref 136–145)
WBC # BLD AUTO: 6.2 X10*3/UL (ref 4.4–11.3)

## 2024-07-10 PROCEDURE — 2500000004 HC RX 250 GENERAL PHARMACY W/ HCPCS (ALT 636 FOR OP/ED): Performed by: INTERNAL MEDICINE

## 2024-07-10 PROCEDURE — 93505 ENDOMYOCARDIAL BIOPSY: CPT | Performed by: INTERNAL MEDICINE

## 2024-07-10 PROCEDURE — 36415 COLL VENOUS BLD VENIPUNCTURE: CPT | Performed by: INTERNAL MEDICINE

## 2024-07-10 PROCEDURE — 2720000007 HC OR 272 NO HCPCS: Performed by: INTERNAL MEDICINE

## 2024-07-10 PROCEDURE — 99152 MOD SED SAME PHYS/QHP 5/>YRS: CPT | Performed by: INTERNAL MEDICINE

## 2024-07-10 PROCEDURE — 93451 RIGHT HEART CATH: CPT | Performed by: INTERNAL MEDICINE

## 2024-07-10 PROCEDURE — 80048 BASIC METABOLIC PNL TOTAL CA: CPT | Performed by: INTERNAL MEDICINE

## 2024-07-10 PROCEDURE — 7100000009 HC PHASE TWO TIME - INITIAL BASE CHARGE: Performed by: INTERNAL MEDICINE

## 2024-07-10 PROCEDURE — 99153 MOD SED SAME PHYS/QHP EA: CPT | Performed by: INTERNAL MEDICINE

## 2024-07-10 PROCEDURE — 2500000005 HC RX 250 GENERAL PHARMACY W/O HCPCS: Performed by: INTERNAL MEDICINE

## 2024-07-10 PROCEDURE — 85027 COMPLETE CBC AUTOMATED: CPT | Performed by: INTERNAL MEDICINE

## 2024-07-10 PROCEDURE — 7100000010 HC PHASE TWO TIME - EACH INCREMENTAL 1 MINUTE: Performed by: INTERNAL MEDICINE

## 2024-07-10 PROCEDURE — C1894 INTRO/SHEATH, NON-LASER: HCPCS | Performed by: INTERNAL MEDICINE

## 2024-07-10 PROCEDURE — C1727 CATH, BAL TIS DIS, NON-VAS: HCPCS | Performed by: INTERNAL MEDICINE

## 2024-07-10 RX ORDER — PRAVASTATIN SODIUM 40 MG/1
40 TABLET ORAL NIGHTLY
Qty: 90 TABLET | Refills: 3 | Status: SHIPPED | OUTPATIENT
Start: 2024-07-10

## 2024-07-10 RX ORDER — FERROUS SULFATE, DRIED 160(50) MG
2 TABLET, EXTENDED RELEASE ORAL DAILY
COMMUNITY

## 2024-07-10 RX ORDER — LIDOCAINE HYDROCHLORIDE 20 MG/ML
INJECTION, SOLUTION INFILTRATION; PERINEURAL AS NEEDED
Status: DISCONTINUED | OUTPATIENT
Start: 2024-07-10 | End: 2024-07-10 | Stop reason: HOSPADM

## 2024-07-10 RX ORDER — MIDAZOLAM HYDROCHLORIDE 1 MG/ML
INJECTION, SOLUTION INTRAMUSCULAR; INTRAVENOUS AS NEEDED
Status: DISCONTINUED | OUTPATIENT
Start: 2024-07-10 | End: 2024-07-10 | Stop reason: HOSPADM

## 2024-07-10 RX ORDER — SPIRONOLACTONE 25 MG/1
25 TABLET ORAL DAILY
Qty: 30 TABLET | Refills: 11 | Status: SHIPPED | OUTPATIENT
Start: 2024-07-10 | End: 2025-07-10

## 2024-07-10 RX ORDER — FENTANYL CITRATE 50 UG/ML
INJECTION, SOLUTION INTRAMUSCULAR; INTRAVENOUS AS NEEDED
Status: DISCONTINUED | OUTPATIENT
Start: 2024-07-10 | End: 2024-07-10 | Stop reason: HOSPADM

## 2024-07-10 ASSESSMENT — COLUMBIA-SUICIDE SEVERITY RATING SCALE - C-SSRS
6. HAVE YOU EVER DONE ANYTHING, STARTED TO DO ANYTHING, OR PREPARED TO DO ANYTHING TO END YOUR LIFE?: NO
1. IN THE PAST MONTH, HAVE YOU WISHED YOU WERE DEAD OR WISHED YOU COULD GO TO SLEEP AND NOT WAKE UP?: NO
2. HAVE YOU ACTUALLY HAD ANY THOUGHTS OF KILLING YOURSELF?: NO

## 2024-07-10 NOTE — POST-PROCEDURE NOTE
Physician Transition of Care Summary  Invasive Cardiovascular Lab    Procedure Date: 7/10/2024  Attending:    * Chico Bowling - Primary  Resident/Fellow/Other Assistant: Surgeons and Role:  * No surgeons found with a matching role *    Indications:   Pre-op Diagnosis      * Pulmonary hypertension (Multi) [I27.20]    Post-procedure diagnosis:   Post-op Diagnosis     * Pulmonary hypertension (Multi) [I27.20]    Procedure(s):   Right Heart Cath  38667 - [place] RIGHT HEART CATH O2 SATURATION & CARDIAC OUTPUT        Procedure Findings:   Preserved CO/CI with PAH and elevated filling pressures    Description of the Procedure:   RHC via RIJ    Complications:   None    Estimated Blood Loss:   5 mL    Anesthesia: Moderate Sedation Anesthesia Staff: No anesthesia staff entered.    Any Specimen(s) Removed:   Order Name Source Comment Collection Info Order Time   BASIC METABOLIC PANEL Blood, Venous  Collected By: Kinsey Bolivar RN 7/10/2024  7:17 AM     Release result to [facility]   Immediate        CBC Blood, Venous  Collected By: Kinsey Bolivar RN 7/10/2024  7:17 AM     Release result to [employer] [employer]   Immediate            Disposition:   Follow up in PAH clinic for ongoing management.        Electronically signed by: Chico Bowling DO 7/10/2024 9:19 AM

## 2024-07-10 NOTE — PROGRESS NOTES
Pharmacy Medication History Review    Jessica Umaña is a 74 y.o. female admitted for Pulmonary hypertension ([facility]). Pharmacy reviewed the patient's [id number] medications and allergies for accuracy.    The list below reflects the updated PTA list. Comments regarding how patient may be taking medications differently can be found in the Admit Orders Activity  Prior to Admission Medications   Prescriptions Last Dose Informant   Opsumit 10 mg tablet tablet 7/9/2024 Self   Sig: Take 1 tablet (10 mg) by mouth once daily.   ascorbic acid (Vitamin C) 250 mg tablet 7/9/2024 Self   Sig: Take 1 tablet (250 mg) by mouth once daily.   aspirin 81 mg EC tablet 7/9/2024 Self   Sig: Take 1 tablet (81 mg) by mouth once daily.   calcium carbonate-vitamin D3 500 mg-5 mcg (200 unit) tablet 7/9/2024 Self   Sig: Take 2 tablets by mouth once daily.   clopidogrel (Plavix) 75 mg tablet 7/9/2024 Self   Sig: Take 1 tablet (75 mg) by mouth once daily.   cyanocobalamin (Vitamin B-12) 500 mcg tablet 7/9/2024 Self   Sig: Take 1 tablet (500 mcg) by mouth once daily.   fluticasone (Flonase) 50 mcg/actuation nasal spray 7/9/2024 Self   Sig: Administer into affected nostril(s) once daily.   loratadine (Claritin) 10 mg tablet 7/9/2024 Self   Sig: Take 1 tablet (10 mg) by mouth once daily as needed.   mv-calcium-min-iron fm-FA-vitK (Multi For Her) 18 mg iron-600 mcg-80 mcg tablet 7/9/2024 Self   Sig: Take 1 tablet by mouth once daily.   pantoprazole (ProtoNix) 40 mg EC tablet 7/9/2024 Self   Sig: TAKE 1 TABLET EVERY DAY   pravastatin (Pravachol) 40 mg tablet 7/9/2024 Self   Sig: TAKE 1 TABLET AT BEDTIME   pseudoephedrine (Sudafed) 30 mg tablet Past Week Self   Sig: Take 2 tablets (60 mg) by mouth every 6 hours if needed.   tadalafil 20 mg tablet 7/9/2024 Self   Sig: Take 1 tablet (20 mg) by mouth once daily.      Facility-Administered Medications: None        The list below reflects the updated allergy list. Please review each documented allergy  for additional clarification and justification.  Allergies  Reviewed by Karmen Bashir, TishD on 7/10/2024        Severity Reactions Comments    Acetaminophen Low Other, Palpitations     Penicillins Low Other, Rash             Patient was unable to be assessed for M2B at discharge. Pharmacy has been updated to [facility]. M2B service not offered prior to surgery, please reassess prior to patient discharge if Meds to Beds is desired.     Sources used to complete the med history include: Patient interview - had list of medications/ Pharmacy - [facility], [facility]/ Chart review - Cardiology visit 4/9/24, Pulmonology visit 1/17/24     Karmen Bashir, PharmD  Transitions of Care Pharmacist  [facility]  Please reach out via Secure Chat for questions, or if no response call AVdirect or [facility]

## 2024-07-10 NOTE — H&P
History Of Present Illness  Jessica Umaña is a 74 y.o. female presenting for RHC for PAH monitoring on therapy.      Past Medical History  Past Medical History:   Diagnosis Date    Personal history of other diseases of the circulatory system 06/07/2017    History of diastolic dysfunction    Personal history of other diseases of the female genital tract 06/07/2017    History of endometriosis       Surgical History  Past Surgical History:   Procedure Laterality Date    HYSTERECTOMY  06/07/2017    Hysterectomy        Social History  She reports that she has never smoked. She has never used smokeless tobacco. She reports that she does not currently use alcohol. She reports that she does not currently use drugs.    Family History  No family history on file.     Allergies  Acetaminophen and Penicillins         Physical Exam  On exam Ms. Umaña appears her stated age, is alert and oriented x3, and in no acute distress. Her sclera are anicteric and her oropharynx has moist mucous membranes. Her neck is supple and without thyromegaly. The JVP is ~5 cm of water above the right atrium. Her cardiac exam has regular rhythm, normal S1, S2. No S3/4. There are no murmurs. Her lungs are clear to auscultation bilaterally and there is no dullness to percussion. Her abdomen is soft, nontender with normoactive bowel sounds. There is no HJR. The extremities are warm and without edema. The skin is dry. There is no rash present. The distal pulses are 2+ in all four extremities. Her mood and affect are appropriate for todays encounter.            Assessment/Plan   Principal Problem:    Pulmonary hypertension (Multi)    74F with PAH here for post-therapy ongoing surveillance RHC.       Chico Bowling,

## 2024-07-10 NOTE — DISCHARGE INSTRUCTIONS
CARDIAC CATHETERIZATION DISCHARGE INSTRUCTIONS (procedure done on 7/10/24)     FOR SUDDEN AND SEVERE CHEST PAIN, SHORTNESS OF BREATH, EXCESSIVE BLEEDING, SIGNS OF STROKE, OR CHANGES IN MENTAL STATUS YOU SHOULD CALL 911 IMMEDIATELY.     If your provider has prescribed aspirin and/or clopidogrel (Plavix), or prasugrel (Effient), or ticagrelor (Brilinta), DO NOT STOP THESE MEDICATIONS for any reason without talking to your cardiologist first. If any of these were prescribed, you must take them every day without missing a single dose. If you are getting low on these medications, contact your provider immediately for a refill.     FOR NEXT 24 HOURS  - Upon discharge, you should return home and rest for the remainder of the day and evening. You do not have to stay on bed rest but should not be very active.  It is recommended a responsible adult be with you for the first 24 hours after the procedure.    - No driving for 24 hours after procedure. Please arrange for someone to drive you home from the hospital today.     - Do not drive, operate machinery, or use power tools for 24 hours after your procedure.     - Do not make any legal decisions for 24 hours after your procedure.     - Do not drink alcoholic beverages for 24 hours after your procedure.    WOUND CARE   *FOR FEMORAL (LEG) ACCESS*  ·      Avoid heavy lifting (over 10 pounds) for 7 days, squatting or excessive bending for 2 days, and strenuous exercise for 7 days.  ·      No submerged bathing, swimming, or hot tubs for the next 7 days, or until fully healed.  ·      Avoid sexual activity for 3-4 days until any groin discomfort has ceased.     *FOR RADIAL (WRIST) ACCESS*  ·      No lifting more than 5 pounds or excessive use of the wrist for 24 hours - for example, treat your wrist as if it is sprained.  ·      Do not engage in vigorous activities (tennis, golf, bowling, weights) for at least 48 hours after the procedure.  ·      Do not submerge the wrist  for 7 days after the procedure.  ·      You should expect mild tingling in your hand and tenderness at the puncture site for up to 3 days.    - The transparent dressing should be removed from the site 24 hours after the procedure.  Wash the site gently with soap and water. Rinse well and pat dry. Keep the area clean and dry. You may apply a Band-Aid to the site. Avoid lotions, ointments, or powders until fully healed.     - You may shower the day after your procedure.      - It is normal to notice a small bruise around the puncture site and/or a small grape sized or smaller lump. Any large bruising or large lump warrants a call to the office.     - If bleeding should occur, lay down and apply pressure to the affected area for 10 minutes.  If the bleeding stops notify your physician.  If there is a large amount of bleeding or spurting of blood CALL 911 immediately.  DO NOT drive yourself to the hospital.    - You may experience some tenderness, bruising or minimal inflammation.  If you have any concerns, you may contact the Cath Lab or if any of these symptoms become excessive, contact your cardiologist or go to the emergency room.     OTHER INSTRUCTIONS  - You may take acetaminophen (Tylenol) as directed for discomfort.  If pain is not relieved with acetaminophen (Tylenol), contact your doctor.    - If you notice or experience any of the following, you should notify your doctor or seek medical attention  Chest pain or discomfort  Change in mental status or weakness in extremities.  Dizziness, light headedness, or feeling faint.  Change in the site where the procedure was performed, such as bleeding or an increased area of bruising or swelling.  Tingling, numbness, pain, or coolness in the leg/arm beyond the site where the procedure was performed.  Signs of infection (i.e. shaking chills, temperature > 100 degrees Fahrenheit, warmth, redness) in the leg/arm area where the procedure was performed.  Changes in  urination   Bloody or black stools  Vomiting blood  Severe nose bleeds  Any excessive bleeding    - If you DO NOT have an appointment with your cardiologist within 2-4 weeks following your procedure, please contact their office.

## 2024-07-11 ENCOUNTER — TELEPHONE (OUTPATIENT)
Dept: PULMONOLOGY | Facility: HOSPITAL | Age: 74
End: 2024-07-11
Payer: MEDICARE

## 2024-07-11 NOTE — TELEPHONE ENCOUNTER
Discussed lab results with pt and new medication spironolactone. Pt took first dose today. Order to do a BNP and renal panel in 2 weeks and reschedule appointment for October follow up. Pt agrees with plan of care.

## 2024-07-24 ENCOUNTER — LAB (OUTPATIENT)
Dept: LAB | Facility: LAB | Age: 74
End: 2024-07-24
Payer: MEDICARE

## 2024-07-24 DIAGNOSIS — I50.30 DIASTOLIC HEART FAILURE, UNSPECIFIED HF CHRONICITY (MULTI): ICD-10-CM

## 2024-07-24 LAB
ALBUMIN SERPL BCP-MCNC: 4.4 G/DL (ref 3.4–5)
ANION GAP SERPL CALC-SCNC: 13 MMOL/L (ref 10–20)
BNP SERPL-MCNC: 22 PG/ML (ref 0–99)
BUN SERPL-MCNC: 15 MG/DL (ref 6–23)
CALCIUM SERPL-MCNC: 9.3 MG/DL (ref 8.6–10.3)
CHLORIDE SERPL-SCNC: 105 MMOL/L (ref 98–107)
CO2 SERPL-SCNC: 27 MMOL/L (ref 21–32)
CREAT SERPL-MCNC: 0.88 MG/DL (ref 0.5–1.05)
EGFRCR SERPLBLD CKD-EPI 2021: 69 ML/MIN/1.73M*2
GLUCOSE SERPL-MCNC: 79 MG/DL (ref 74–99)
PHOSPHATE SERPL-MCNC: 4 MG/DL (ref 2.5–4.9)
POTASSIUM SERPL-SCNC: 4.3 MMOL/L (ref 3.5–5.3)
SODIUM SERPL-SCNC: 141 MMOL/L (ref 136–145)

## 2024-07-24 PROCEDURE — 80069 RENAL FUNCTION PANEL: CPT

## 2024-07-24 PROCEDURE — 83880 ASSAY OF NATRIURETIC PEPTIDE: CPT

## 2024-07-24 PROCEDURE — 36415 COLL VENOUS BLD VENIPUNCTURE: CPT

## 2024-08-12 ENCOUNTER — DOCUMENTATION (OUTPATIENT)
Dept: PULMONOLOGY | Facility: HOSPITAL | Age: 74
End: 2024-08-12
Payer: MEDICARE

## 2024-08-12 DIAGNOSIS — I50.30 DIASTOLIC HEART FAILURE, UNSPECIFIED HF CHRONICITY (MULTI): ICD-10-CM

## 2024-08-12 DIAGNOSIS — R06.02 SOB (SHORTNESS OF BREATH): ICD-10-CM

## 2024-08-12 DIAGNOSIS — I27.20 PULMONARY HYPERTENSION (MULTI): ICD-10-CM

## 2024-08-12 RX ORDER — SPIRONOLACTONE 25 MG/1
25 TABLET ORAL DAILY
Qty: 30 TABLET | Refills: 11 | Status: SHIPPED | OUTPATIENT
Start: 2024-08-12 | End: 2024-08-12 | Stop reason: WASHOUT

## 2024-08-12 RX ORDER — SPIRONOLACTONE 25 MG/1
25 TABLET ORAL DAILY
Qty: 30 TABLET | Refills: 11 | Status: SHIPPED | OUTPATIENT
Start: 2024-08-12 | End: 2025-08-12

## 2024-08-12 NOTE — PROGRESS NOTES
Order sent to [facility] for portable oxygen concentrator as company reported patients is broken. Fax sent to [phone]

## 2024-08-14 ENCOUNTER — APPOINTMENT (OUTPATIENT)
Dept: RESPIRATORY THERAPY | Facility: HOSPITAL | Age: 74
End: 2024-08-14
Payer: MEDICARE

## 2024-08-14 ENCOUNTER — APPOINTMENT (OUTPATIENT)
Dept: PULMONOLOGY | Facility: HOSPITAL | Age: 74
End: 2024-08-14
Payer: MEDICARE

## 2024-09-10 NOTE — PROGRESS NOTES
History Of Present Illness  Jessica Umaña is a 74 y.o. female presenting with pulmonary arterial hypertension. Patient is NYHA Functional Class I-II and WHO Group 1. She was last seen in clinic 4/25/2024. Initially referred to our office by Dr. Heidi Hernandez in 2017.    PAH Treatment:  Opsumit (9/13/17- current)  Tadalafil (12/1/18- current)  Oxygen, 5-6LPM w/ slow ambulation  Infusion site: N/A  Treatment history:   Adcirca (9/21/2017-12/1/2018) switch to generic    Today's testing includes 6 MWT       Interval History   Pt reports breathing has been worse since last visit due to multiple equipment failures over the past 6 weeks that has now been corrected. Still recovering from fatigue with this situation. Pt reports walking treadmill every day which has been on hold over the week and hopes to return to this. No problems with edema per patient.      Past Medical History  Patient Active Problem List   Diagnosis    CAD (coronary artery disease)    Pulmonary hypertension (Multi)        Surgical History  She has a past surgical history that includes Hysterectomy (06/07/2017) and Cardiac catheterization (N/A, 7/10/2024).     Social History  She reports that she has never smoked. She has never used smokeless tobacco. She reports that she does not currently use alcohol. She reports that she does not currently use drugs.    Family History  No family history on file.     Medications  Current Outpatient Medications:     ascorbic acid (Vitamin C) 250 mg tablet, Take 1 tablet (250 mg) by mouth once daily., Disp: , Rfl:     aspirin 81 mg EC tablet, Take 1 tablet (81 mg) by mouth once daily., Disp: , Rfl:     calcium carbonate-vitamin D3 500 mg-5 mcg (200 unit) tablet, Take 2 tablets by mouth once daily., Disp: , Rfl:     clopidogrel (Plavix) 75 mg tablet, Take 1 tablet (75 mg) by mouth once daily., Disp: 90 tablet, Rfl: 2    cyanocobalamin (Vitamin B-12) 500 mcg tablet, Take 1 tablet (500 mcg) by mouth once daily., Disp: , Rfl:      fluticasone (Flonase) 50 mcg/actuation nasal spray, Administer into affected nostril(s) once daily., Disp: , Rfl:     loratadine (Claritin) 10 mg tablet, Take 1 tablet (10 mg) by mouth once daily as needed., Disp: , Rfl:     mv-calcium-min-iron fm-FA-vitK (Multi For Her) 18 mg iron-600 mcg-80 mcg tablet, Take 1 tablet by mouth once daily., Disp: , Rfl:     Opsumit 10 mg tablet tablet, Take 1 tablet (10 mg) by mouth once daily., Disp: , Rfl:     pantoprazole (ProtoNix) 40 mg EC tablet, TAKE 1 TABLET EVERY DAY, Disp: 90 tablet, Rfl: 3    pravastatin (Pravachol) 40 mg tablet, TAKE 1 TABLET AT BEDTIME, Disp: 90 tablet, Rfl: 3    pseudoephedrine (Sudafed) 30 mg tablet, Take 2 tablets (60 mg) by mouth every 6 hours if needed., Disp: , Rfl:     spironolactone (Aldactone) 25 mg tablet, Take 1 tablet (25 mg) by mouth once daily., Disp: 30 tablet, Rfl: 11    tadalafil 20 mg tablet, Take 1 tablet (20 mg) by mouth once daily., Disp: , Rfl:      Allergies  Acetaminophen and Penicillins    Review of Systems   Constitutional:  Positive for fatigue.   HENT: Negative.     Eyes: Negative.    Respiratory:  Positive for shortness of breath. Negative for chest tightness.    Cardiovascular:  Negative for chest pain and leg swelling.   Gastrointestinal: Negative.    Endocrine: Negative.    Genitourinary: Negative.    Musculoskeletal: Negative.    Allergic/Immunologic: Negative.    Neurological:  Negative for dizziness, syncope, light-headedness and headaches.   Hematological: Negative.    Psychiatric/Behavioral: Negative.         Last Recorded Vitals  /79 (BP Location: Left arm, Patient Position: Sitting)   Pulse 88   Ht 1.524 m (5')   Wt 48.2 kg (106 lb 3.2 oz)   SpO2 94% Comment: 2 Liters O2  BMI 20.74 kg/m²        Physical Exam  Constitutional:       Appearance: Normal appearance. She is obese.   HENT:      Head: Normocephalic.      Nose: Nose normal.   Eyes:      Pupils: Pupils are equal, round, and reactive to light.    Cardiovascular:      Rate and Rhythm: Normal rate and regular rhythm.      Heart sounds: Normal heart sounds.   Pulmonary:      Effort: Pulmonary effort is normal.      Breath sounds: Normal breath sounds.   Abdominal:      General: Bowel sounds are normal.      Palpations: Abdomen is soft.   Musculoskeletal:      Right lower leg: No edema.      Left lower leg: No edema.   Skin:     Findings: Rash present.   Neurological:      General: No focal deficit present.   Psychiatric:         Mood and Affect: Mood normal.         Judgment: Judgment normal.            Relevant Results    6MWT (2024)  OX31-908-60% on 6 LPM NC  HR-  MORE-0-7  Actual Meters- 428m    RHC (7/10/2024)  PAP-52/23/(32)   PWP-15  CO/CI-3.2/5.9  EDP - 12    6MWT (2024)  SpO2: 99/87 6L  HR: 55/129  More: 0/6  Actual meters: 459     Echo (2024)   Left Ventricle: The left ventricular systolic function is normal, with an estimated ejection fraction of 55-60%. The calculated ejection fraction is normal at 58 % using the Smith's Bi-plane MOD calculation. The left ventricular cavity size is normal. Spectral Doppler shows an impaired relaxation pattern of left ventricular diastolic filling.  Left Atrium: The left atrium is normal in size.  Right Ventricle: The right ventricle is mildly enlarged. There is normal right ventricular global systolic function.  Right Atrium: The right atrium is normal in size.  Aortic Valve: The aortic valve is trileaflet. There is trivial aortic valve regurgitation. The peak instantaneous gradient of the aortic valve is 7.0 mmHg.  Mitral Valve: The mitral valve is normal in structure. There is trace mitral valve regurgitation.  Tricuspid Valve: The tricuspid valve is structurally normal. There is trace tricuspid regurgitation.  Pulmonic Valve: The pulmonic valve is structurally normal. There is physiologic pulmonic valve regurgitation.  Pericardium: There is no pericardial effusion noted.  Aorta: The aortic  "root is normal.  In comparison to the previous echocardiogram(s): RV size and function and LV function comparable to the prior echocardiogram from 2023.  CONCLUSIONS:   1. Left ventricular systolic function is normal with a 55-60% estimated ejection fraction.   2. Spectral Doppler shows an impaired relaxation pattern of left ventricular diastolic filling.     6MWT (2024)  SpO2: 95-84% 6LPM  HR:   More: 0-6  Actual meters: 448     6MWT (2023)  SpO2: 97-88% 6LPM  HR:   More: 0-4  Actual meters: 442     6MWT (2023)  SpO2: 97-85% 6LPM  HR:   More: 0-5  Actual meters: 454     6MWT (23)  HR 64 - 125  Spo2 99 - 88 on 6LPM  More 0 - 5  Actual 428m / 393m predicted      Echo (2023)   The right ventricle is mildly enlarged. There is low normal right ventricular systolic function.  The right atrium is normal in size.     Echo (23)  normal size RV with normal function,   normal size RA     RHC (2021)  PAp 41/16 (26)  PW 9  C.O. 6.5 / C.I. 3.5     Assessment/Plan     1) Pulmonary   a. IPAH FC i - II on dual oral therapies. and improving and significant objective and subjective improvement. Walk distance Well over presentation and stable. Last echo  today with suboptimal RV views but likely unchaged from last..Cath 2018, mPAP =27 mm Hg. Feels \"wonderful\". Echo 2020 normal RV by my eye. Due for cath this year. Cath 2021 mPAP = 26 mm Hg. Echo 2023 with \"normal RV\", I disagree with that, at least mild + but stable, echo 2023 same. SpO2 on room air rest = 98%     2024- 6MW down some in the setting of leg injury.    2024 echo RV still > normal by my eye. Walk and subjective function stable.     2023 - Walk (+) 26m, desat to 85% on 6L/min     2024 - Walk stable , no change, no interval medical issues.     2) (+) GLADYS without evidence of systemic disease.     3) Endocrine  a. diabetes  b. Obesity , BMI =33.4 -34.9 -> 33.29      4) " Cardiac - stented CAD with some benefit.    Plan    1) Oxygen at 5-6L/min with slow ambulation. Continue sleep   O2, no oxygen needed awake at rest.  2) Continue macitentan and Adcirca.  3) Follow up approximately 4 month  with 6 MW, cath with PVR= 244 Dynes·sec·cm??. No change at this point.   5) Weight loss.  6) Check CBC, chem complete, BNP to monitor disease progression and long term use of meds.

## 2024-09-25 ENCOUNTER — OFFICE VISIT (OUTPATIENT)
Dept: PULMONOLOGY | Facility: HOSPITAL | Age: 74
End: 2024-09-25
Payer: MEDICARE

## 2024-09-25 ENCOUNTER — HOSPITAL ENCOUNTER (OUTPATIENT)
Dept: RESPIRATORY THERAPY | Facility: HOSPITAL | Age: 74
Discharge: HOME | End: 2024-09-25
Payer: MEDICARE

## 2024-09-25 ENCOUNTER — LAB (OUTPATIENT)
Dept: LAB | Facility: LAB | Age: 74
End: 2024-09-25
Payer: MEDICARE

## 2024-09-25 VITALS
HEIGHT: 62 IN | DIASTOLIC BLOOD PRESSURE: 58 MMHG | SYSTOLIC BLOOD PRESSURE: 95 MMHG | OXYGEN SATURATION: 98 % | HEART RATE: 58 BPM | BODY MASS INDEX: 31.94 KG/M2 | WEIGHT: 173.6 LBS

## 2024-09-25 DIAGNOSIS — R06.02 SOB (SHORTNESS OF BREATH): ICD-10-CM

## 2024-09-25 DIAGNOSIS — I27.20 PULMONARY HYPERTENSION (MULTI): ICD-10-CM

## 2024-09-25 DIAGNOSIS — Z79.899 LONG-TERM USE OF HIGH-RISK MEDICATION: ICD-10-CM

## 2024-09-25 LAB
ALBUMIN SERPL BCP-MCNC: 4.6 G/DL (ref 3.4–5)
ALP SERPL-CCNC: 49 U/L (ref 33–136)
ALT SERPL W P-5'-P-CCNC: 11 U/L (ref 7–45)
ANION GAP SERPL CALC-SCNC: 12 MMOL/L (ref 10–20)
AST SERPL W P-5'-P-CCNC: 16 U/L (ref 9–39)
BILIRUB SERPL-MCNC: 0.4 MG/DL (ref 0–1.2)
BNP SERPL-MCNC: 23 PG/ML (ref 0–99)
BUN SERPL-MCNC: 15 MG/DL (ref 6–23)
CALCIUM SERPL-MCNC: 9.8 MG/DL (ref 8.6–10.6)
CHLORIDE SERPL-SCNC: 106 MMOL/L (ref 98–107)
CO2 SERPL-SCNC: 27 MMOL/L (ref 21–32)
CREAT SERPL-MCNC: 1 MG/DL (ref 0.5–1.05)
EGFRCR SERPLBLD CKD-EPI 2021: 60 ML/MIN/1.73M*2
ERYTHROCYTE [DISTWIDTH] IN BLOOD BY AUTOMATED COUNT: 14.9 % (ref 11.5–14.5)
GLUCOSE SERPL-MCNC: 90 MG/DL (ref 74–99)
HCT VFR BLD AUTO: 40.5 % (ref 36–46)
HGB BLD-MCNC: 13.3 G/DL (ref 12–16)
MAGNESIUM SERPL-MCNC: 2.23 MG/DL (ref 1.6–2.4)
MCH RBC QN AUTO: 28.8 PG (ref 26–34)
MCHC RBC AUTO-ENTMCNC: 32.8 G/DL (ref 32–36)
MCV RBC AUTO: 88 FL (ref 80–100)
NRBC BLD-RTO: 0 /100 WBCS (ref 0–0)
PLATELET # BLD AUTO: 264 X10*3/UL (ref 150–450)
POTASSIUM SERPL-SCNC: 4.2 MMOL/L (ref 3.5–5.3)
PROT SERPL-MCNC: 8.1 G/DL (ref 6.4–8.2)
RBC # BLD AUTO: 4.62 X10*6/UL (ref 4–5.2)
SODIUM SERPL-SCNC: 141 MMOL/L (ref 136–145)
WBC # BLD AUTO: 7.1 X10*3/UL (ref 4.4–11.3)

## 2024-09-25 PROCEDURE — 1126F AMNT PAIN NOTED NONE PRSNT: CPT | Performed by: INTERNAL MEDICINE

## 2024-09-25 PROCEDURE — 85027 COMPLETE CBC AUTOMATED: CPT

## 2024-09-25 PROCEDURE — 94618 PULMONARY STRESS TESTING: CPT | Performed by: INTERNAL MEDICINE

## 2024-09-25 PROCEDURE — 3008F BODY MASS INDEX DOCD: CPT | Performed by: INTERNAL MEDICINE

## 2024-09-25 PROCEDURE — 1036F TOBACCO NON-USER: CPT | Performed by: INTERNAL MEDICINE

## 2024-09-25 PROCEDURE — 99215 OFFICE O/P EST HI 40 MIN: CPT | Performed by: INTERNAL MEDICINE

## 2024-09-25 PROCEDURE — 83735 ASSAY OF MAGNESIUM: CPT

## 2024-09-25 PROCEDURE — 83880 ASSAY OF NATRIURETIC PEPTIDE: CPT

## 2024-09-25 PROCEDURE — 94618 PULMONARY STRESS TESTING: CPT

## 2024-09-25 PROCEDURE — 80053 COMPREHEN METABOLIC PANEL: CPT

## 2024-09-25 PROCEDURE — 36415 COLL VENOUS BLD VENIPUNCTURE: CPT

## 2024-09-25 ASSESSMENT — ENCOUNTER SYMPTOMS
HEADACHES: 0
SHORTNESS OF BREATH: 1
MUSCULOSKELETAL NEGATIVE: 1
ENDOCRINE NEGATIVE: 1
HEMATOLOGIC/LYMPHATIC NEGATIVE: 1
DIZZINESS: 0
LIGHT-HEADEDNESS: 0
FATIGUE: 1
EYES NEGATIVE: 1
GASTROINTESTINAL NEGATIVE: 1
ALLERGIC/IMMUNOLOGIC NEGATIVE: 1
CHEST TIGHTNESS: 0
PSYCHIATRIC NEGATIVE: 1

## 2024-09-25 ASSESSMENT — PAIN SCALES - GENERAL: PAINLEVEL: 0-NO PAIN

## 2024-10-09 ENCOUNTER — APPOINTMENT (OUTPATIENT)
Dept: RESPIRATORY THERAPY | Facility: HOSPITAL | Age: 74
End: 2024-10-09
Payer: MEDICARE

## 2024-10-09 ENCOUNTER — APPOINTMENT (OUTPATIENT)
Dept: PULMONOLOGY | Facility: HOSPITAL | Age: 74
End: 2024-10-09
Payer: MEDICARE

## 2024-10-14 ENCOUNTER — APPOINTMENT (OUTPATIENT)
Dept: PULMONOLOGY | Facility: HOSPITAL | Age: 74
End: 2024-10-14
Payer: MEDICARE

## 2024-12-09 DIAGNOSIS — E11.9 TYPE 2 DIABETES MELLITUS WITHOUT COMPLICATIONS (MULTI): ICD-10-CM

## 2024-12-09 DIAGNOSIS — I25.82 CHRONIC TOTAL OCCLUSION OF CORONARY ARTERY: ICD-10-CM

## 2024-12-09 RX ORDER — MACITENTAN 10 MG/1
10 TABLET, FILM COATED ORAL DAILY
Qty: 30 TABLET | Refills: 11 | Status: SHIPPED | OUTPATIENT
Start: 2024-12-09

## 2024-12-26 NOTE — PROGRESS NOTES
"History Of Present Illness  Jessica Umaña is a 74 y.o. female presenting with pulmonary arterial hypertension follow up. Patient is NYHA Functional Class 2+ and WHO Group 1. She was last seen in clinic 9/25/2024. Initially referred to our office by Dr. Heidi Hernandez in 2017.     PAH Treatment:  Opsumit (9/13/2017)  Tadalafil (12/1/2018)  Oxygen, 5-6 LPM w/ slow ambulation   Infusion site: N/A  Treatment history:   Adcirca (9/21/2017-12/1/2018) switch to generic    Today's testing includes Echo, 6 MWT and Labs    Interval History   Accompanied by her son to today's visit. Feels \"good\" overall. Shortness of breath with activity. On 4 L NC continuous. Offers no other c/o. Walk test with improvement.  Will get labs after today's visit.           Past Medical History  Patient Active Problem List   Diagnosis    CAD (coronary artery disease)    Pulmonary hypertension (Multi)        Surgical History  She has a past surgical history that includes Hysterectomy (06/07/2017) and Cardiac catheterization (N/A, 7/10/2024).     Social History  She reports that she has never smoked. She has never used smokeless tobacco. She reports that she does not currently use alcohol. She reports that she does not currently use drugs.    Family History  No family history on file.     Medications  Current Outpatient Medications:     ascorbic acid (Vitamin C) 250 mg tablet, Take 1 tablet (250 mg) by mouth once daily., Disp: , Rfl:     aspirin 81 mg EC tablet, Take 1 tablet (81 mg) by mouth once daily., Disp: , Rfl:     calcium carbonate-vitamin D3 500 mg-5 mcg (200 unit) tablet, Take 2 tablets by mouth once daily., Disp: , Rfl:     clopidogrel (Plavix) 75 mg tablet, Take 1 tablet (75 mg) by mouth once daily., Disp: 90 tablet, Rfl: 2    cyanocobalamin (Vitamin B-12) 500 mcg tablet, Take 1 tablet (500 mcg) by mouth once daily., Disp: , Rfl:     fluticasone (Flonase) 50 mcg/actuation nasal spray, Administer into affected nostril(s) once daily., Disp: " , Rfl:     loratadine (Claritin) 10 mg tablet, Take 1 tablet (10 mg) by mouth once daily as needed., Disp: , Rfl:     macitentan (Opsumit) 10 mg tablet tablet, TAKE 1 TABLET BY MOUTH DAILY, Disp: 30 tablet, Rfl: 11    mv-calcium-min-iron fm-FA-vitK (Multi For Her) 18 mg iron-600 mcg-80 mcg tablet, Take 1 tablet by mouth once daily., Disp: , Rfl:     pantoprazole (ProtoNix) 40 mg EC tablet, TAKE 1 TABLET EVERY DAY, Disp: 90 tablet, Rfl: 3    pravastatin (Pravachol) 40 mg tablet, TAKE 1 TABLET AT BEDTIME, Disp: 90 tablet, Rfl: 3    pseudoephedrine (Sudafed) 30 mg tablet, Take 2 tablets (60 mg) by mouth every 6 hours if needed., Disp: , Rfl:     spironolactone (Aldactone) 25 mg tablet, Take 1 tablet (25 mg) by mouth once daily., Disp: 30 tablet, Rfl: 11    tadalafil 20 mg tablet, Take 1 tablet (20 mg) by mouth once daily., Disp: , Rfl:      Allergies  Acetaminophen and Penicillins    Review of Systems   Constitutional: Negative.    HENT: Negative.     Eyes: Negative.    Respiratory:  Positive for shortness of breath.    Cardiovascular: Negative.    Gastrointestinal: Negative.    Endocrine: Negative.    Genitourinary: Negative.    Musculoskeletal: Negative.    Skin: Negative.    Allergic/Immunologic: Negative.    Neurological: Negative.    Hematological: Negative.    Psychiatric/Behavioral: Negative.         Last Recorded Vitals  There were no vitals taken for this visit.     Physical Exam  Constitutional:       Comments: Wheelchair oxygen   HENT:      Head: Normocephalic.      Nose: Nose normal.   Eyes:      Pupils: Pupils are equal, round, and reactive to light.   Neck:      Comments: No JVD  Cardiovascular:      Rate and Rhythm: Normal rate and regular rhythm.      Heart sounds: Murmur heard.   Pulmonary:      Effort: Pulmonary effort is normal.      Breath sounds: Normal breath sounds.   Abdominal:      General: Bowel sounds are normal. There is no distension.      Palpations: Abdomen is soft.   Musculoskeletal:       Right lower leg: No edema.      Left lower leg: No edema.   Skin:     Findings: No rash.   Psychiatric:         Mood and Affect: Mood normal.         Judgment: Judgment normal.            Relevant Results    6MWT (2025)  SP02- [id number]  MORE-0-7  Actual Meters- 435 m    Echo (2025) results pending    6MWT (2024)  IO17-426-78% on 6 LPM NC  HR-  MORE-0-7  Actual Meters- 428m     RHC (7/10/2024)  PAP-52/23/(32)   PWP-15  CO/CI-3.2/5.9  EDP - 12     6MWT (2024)  SpO2: 99/87 6L  HR: 55/129  More: 0/6  Actual meters: 459     Echo (2024)   Left Ventricle: The left ventricular systolic function is normal, with an estimated ejection fraction of 55-60%. The calculated ejection fraction is normal at 58 % using the Smith's Bi-plane MOD calculation. The left ventricular cavity size is normal. Spectral Doppler shows an impaired relaxation pattern of left ventricular diastolic filling.  Left Atrium: The left atrium is normal in size.  Right Ventricle: The right ventricle is mildly enlarged. There is normal right ventricular global systolic function.  Right Atrium: The right atrium is normal in size.  Aortic Valve: The aortic valve is trileaflet. There is trivial aortic valve regurgitation. The peak instantaneous gradient of the aortic valve is 7.0 mmHg.  Mitral Valve: The mitral valve is normal in structure. There is trace mitral valve regurgitation.  Tricuspid Valve: The tricuspid valve is structurally normal. There is trace tricuspid regurgitation.  Pulmonic Valve: The pulmonic valve is structurally normal. There is physiologic pulmonic valve regurgitation.  Pericardium: There is no pericardial effusion noted.  Aorta: The aortic root is normal.  In comparison to the previous echocardiogram(s): RV size and function and LV function comparable to the prior echocardiogram from 2023.  CONCLUSIONS:   1. Left ventricular systolic function is normal with a 55-60% estimated ejection fraction.   2.  "Spectral Doppler shows an impaired relaxation pattern of left ventricular diastolic filling.     6MWT (2024)  SpO2: 95-84% 6LPM  HR:   More: 0-6  Actual meters: 448     6MWT (2023)  SpO2: 97-88% 6LPM  HR:   More: 0-4  Actual meters: 442     6MWT (2023)  SpO2: 97-85% 6LPM  HR:   More: 0-5  Actual meters: 454     6MWT (2023)  HR 64 - 125  Spo2 99 - 88 on 6LPM  More 0 - 5  Actual 428m / 393m predicted      Echo (2023)   The right ventricle is mildly enlarged. There is low normal right ventricular systolic function.  The right atrium is normal in size.     Echo (2023)  normal size RV with normal function,   normal size RA     RHC (2021)  PAP 41/16 (26)  PW 9  C.O. 6.5 / C.I. 3.5     Assessment/Plan     1) Pulmonary   a. IPAH FC i - II on dual oral therapies. and improving and significant objective and subjective improvement. Walk distance Well over presentation and stable. Last echo  today with suboptimal RV views but likely unchaged from last..Cath 2018, mPAP =27 mm Hg. Feels \"wonderful\". Echo 2020 normal RV by my eye. Due for cath this year. Cath 2021 mPAP = 26 mm Hg. Echo 2023 with \"normal RV\", I disagree with that, at least mild + but stable, echo 2023 same. SpO2 on room air rest = 98%     2023 - Walk (+) 26m, desat to 85% on 6L/min     2024 - Walk stable , no change, no interval medical issues.    2024 echo RV still > normal by my eye. Walk and subjective function stable.    2024- 6MW down some in the setting of leg injury.    2025 - RV appears under more pressure by my eye. Patient unchanged     2) (+) GLADYS without evidence of systemic disease.     3) Endocrine  a. diabetes  b. Obesity , BMI =33.4 -34.9 -> 33.29      4) Cardiac - stented CAD with some benefit.    Plan    1) Oxygen at 5-6L/min with slow ambulation. Continue sleep O2, no oxygen needed awake at rest.  2) Continue macitentan and Adcirca.  3) RHC " next 6 weeks.  Virtual or in person visit after to review.  5) Weight loss.  6) Check CBC, chem complete, BNP to monitor disease progression and long term use of meds.

## 2025-01-07 ENCOUNTER — APPOINTMENT (OUTPATIENT)
Dept: RESPIRATORY THERAPY | Facility: HOSPITAL | Age: 75
End: 2025-01-07
Payer: MEDICARE

## 2025-01-07 ENCOUNTER — APPOINTMENT (OUTPATIENT)
Dept: CARDIOLOGY | Facility: HOSPITAL | Age: 75
End: 2025-01-07
Payer: MEDICARE

## 2025-01-08 ENCOUNTER — OFFICE VISIT (OUTPATIENT)
Dept: PULMONOLOGY | Facility: HOSPITAL | Age: 75
End: 2025-01-08
Payer: MEDICARE

## 2025-01-08 ENCOUNTER — HOSPITAL ENCOUNTER (OUTPATIENT)
Dept: CARDIOLOGY | Facility: HOSPITAL | Age: 75
Discharge: HOME | End: 2025-01-08
Payer: MEDICARE

## 2025-01-08 ENCOUNTER — HOSPITAL ENCOUNTER (OUTPATIENT)
Dept: RESPIRATORY THERAPY | Facility: HOSPITAL | Age: 75
Discharge: HOME | End: 2025-01-08
Payer: MEDICARE

## 2025-01-08 ENCOUNTER — LAB (OUTPATIENT)
Dept: LAB | Facility: LAB | Age: 75
End: 2025-01-08
Payer: MEDICARE

## 2025-01-08 VITALS
HEIGHT: 62 IN | OXYGEN SATURATION: 99 % | DIASTOLIC BLOOD PRESSURE: 71 MMHG | WEIGHT: 172.6 LBS | BODY MASS INDEX: 31.76 KG/M2 | SYSTOLIC BLOOD PRESSURE: 106 MMHG | HEART RATE: 71 BPM

## 2025-01-08 DIAGNOSIS — I27.20 PULMONARY HYPERTENSION (MULTI): ICD-10-CM

## 2025-01-08 DIAGNOSIS — R06.02 SOB (SHORTNESS OF BREATH): ICD-10-CM

## 2025-01-08 DIAGNOSIS — I27.0 PRIMARY PULMONARY HYPERTENSION (MULTI): ICD-10-CM

## 2025-01-08 DIAGNOSIS — Z79.899 LONG-TERM USE OF HIGH-RISK MEDICATION: ICD-10-CM

## 2025-01-08 LAB
ALBUMIN SERPL BCP-MCNC: 4.7 G/DL (ref 3.4–5)
ALP SERPL-CCNC: 57 U/L (ref 33–136)
ALT SERPL W P-5'-P-CCNC: 13 U/L (ref 7–45)
ANION GAP SERPL CALC-SCNC: 13 MMOL/L (ref 10–20)
AST SERPL W P-5'-P-CCNC: 21 U/L (ref 9–39)
BILIRUB SERPL-MCNC: 0.3 MG/DL (ref 0–1.2)
BNP SERPL-MCNC: 14 PG/ML (ref 0–99)
BUN SERPL-MCNC: 19 MG/DL (ref 6–23)
CALCIUM SERPL-MCNC: 10.1 MG/DL (ref 8.6–10.6)
CHLORIDE SERPL-SCNC: 106 MMOL/L (ref 98–107)
CO2 SERPL-SCNC: 24 MMOL/L (ref 21–32)
CREAT SERPL-MCNC: 0.92 MG/DL (ref 0.5–1.05)
EGFRCR SERPLBLD CKD-EPI 2021: 66 ML/MIN/1.73M*2
ERYTHROCYTE [DISTWIDTH] IN BLOOD BY AUTOMATED COUNT: 14.7 % (ref 11.5–14.5)
GLUCOSE SERPL-MCNC: 92 MG/DL (ref 74–99)
HCT VFR BLD AUTO: 40.7 % (ref 36–46)
HGB BLD-MCNC: 13.3 G/DL (ref 12–16)
MAGNESIUM SERPL-MCNC: 2.33 MG/DL (ref 1.6–2.4)
MCH RBC QN AUTO: 29.2 PG (ref 26–34)
MCHC RBC AUTO-ENTMCNC: 32.7 G/DL (ref 32–36)
MCV RBC AUTO: 89 FL (ref 80–100)
NRBC BLD-RTO: 0 /100 WBCS (ref 0–0)
PLATELET # BLD AUTO: 225 X10*3/UL (ref 150–450)
POTASSIUM SERPL-SCNC: 4.3 MMOL/L (ref 3.5–5.3)
PROT SERPL-MCNC: 8.2 G/DL (ref 6.4–8.2)
RBC # BLD AUTO: 4.56 X10*6/UL (ref 4–5.2)
SODIUM SERPL-SCNC: 139 MMOL/L (ref 136–145)
WBC # BLD AUTO: 7.9 X10*3/UL (ref 4.4–11.3)

## 2025-01-08 PROCEDURE — 94618 PULMONARY STRESS TESTING: CPT | Performed by: INTERNAL MEDICINE

## 2025-01-08 PROCEDURE — 80053 COMPREHEN METABOLIC PANEL: CPT

## 2025-01-08 PROCEDURE — 3008F BODY MASS INDEX DOCD: CPT | Performed by: INTERNAL MEDICINE

## 2025-01-08 PROCEDURE — 1159F MED LIST DOCD IN RCRD: CPT | Performed by: INTERNAL MEDICINE

## 2025-01-08 PROCEDURE — 99215 OFFICE O/P EST HI 40 MIN: CPT | Performed by: INTERNAL MEDICINE

## 2025-01-08 PROCEDURE — 1126F AMNT PAIN NOTED NONE PRSNT: CPT | Performed by: INTERNAL MEDICINE

## 2025-01-08 PROCEDURE — 93306 TTE W/DOPPLER COMPLETE: CPT | Performed by: INTERNAL MEDICINE

## 2025-01-08 PROCEDURE — 93306 TTE W/DOPPLER COMPLETE: CPT

## 2025-01-08 PROCEDURE — 94618 PULMONARY STRESS TESTING: CPT

## 2025-01-08 PROCEDURE — 85027 COMPLETE CBC AUTOMATED: CPT

## 2025-01-08 PROCEDURE — 83735 ASSAY OF MAGNESIUM: CPT

## 2025-01-08 PROCEDURE — 83880 ASSAY OF NATRIURETIC PEPTIDE: CPT

## 2025-01-08 ASSESSMENT — ENCOUNTER SYMPTOMS
ALLERGIC/IMMUNOLOGIC NEGATIVE: 1
SHORTNESS OF BREATH: 1
CARDIOVASCULAR NEGATIVE: 1
CONSTITUTIONAL NEGATIVE: 1
GASTROINTESTINAL NEGATIVE: 1
ENDOCRINE NEGATIVE: 1
EYES NEGATIVE: 1
HEMATOLOGIC/LYMPHATIC NEGATIVE: 1
MUSCULOSKELETAL NEGATIVE: 1
PSYCHIATRIC NEGATIVE: 1
NEUROLOGICAL NEGATIVE: 1

## 2025-01-08 ASSESSMENT — PATIENT HEALTH QUESTIONNAIRE - PHQ9
SUM OF ALL RESPONSES TO PHQ9 QUESTIONS 1 AND 2: 0
2. FEELING DOWN, DEPRESSED OR HOPELESS: NOT AT ALL
1. LITTLE INTEREST OR PLEASURE IN DOING THINGS: NOT AT ALL

## 2025-01-08 ASSESSMENT — COLUMBIA-SUICIDE SEVERITY RATING SCALE - C-SSRS
1. IN THE PAST MONTH, HAVE YOU WISHED YOU WERE DEAD OR WISHED YOU COULD GO TO SLEEP AND NOT WAKE UP?: NO
2. HAVE YOU ACTUALLY HAD ANY THOUGHTS OF KILLING YOURSELF?: NO
6. HAVE YOU EVER DONE ANYTHING, STARTED TO DO ANYTHING, OR PREPARED TO DO ANYTHING TO END YOUR LIFE?: NO

## 2025-01-08 ASSESSMENT — PAIN SCALES - GENERAL: PAINLEVEL_OUTOF10: 0-NO PAIN

## 2025-01-09 ENCOUNTER — PATIENT MESSAGE (OUTPATIENT)
Dept: PULMONOLOGY | Facility: HOSPITAL | Age: 75
End: 2025-01-09
Payer: MEDICARE

## 2025-01-09 DIAGNOSIS — I27.20 PULMONARY HYPERTENSION (MULTI): ICD-10-CM

## 2025-01-09 LAB
AORTIC VALVE MEAN GRADIENT: 4 MMHG
AORTIC VALVE PEAK VELOCITY: 1.27 M/S
AV PEAK GRADIENT: 6 MMHG
AVA (PEAK VEL): 2.61 CM2
AVA (VTI): 2.42 CM2
EJECTION FRACTION APICAL 4 CHAMBER: 51.1
EJECTION FRACTION: 45 %
LEFT ATRIUM VOLUME AREA LENGTH INDEX BSA: 19.7 ML/M2
LEFT VENTRICLE INTERNAL DIMENSION DIASTOLE: 4.5 CM (ref 3.5–6)
LEFT VENTRICULAR OUTFLOW TRACT DIAMETER: 1.9 CM
MITRAL VALVE E/A RATIO: 0.58
RIGHT VENTRICLE FREE WALL PEAK S': 9.46 CM/S
RIGHT VENTRICLE PEAK SYSTOLIC PRESSURE: 16.5 MMHG
TRICUSPID ANNULAR PLANE SYSTOLIC EXCURSION: 1.3 CM

## 2025-01-28 ASSESSMENT — ENCOUNTER SYMPTOMS
EYES NEGATIVE: 1
MUSCULOSKELETAL NEGATIVE: 1
CARDIOVASCULAR NEGATIVE: 1
CONSTITUTIONAL NEGATIVE: 1
GASTROINTESTINAL NEGATIVE: 1
ALLERGIC/IMMUNOLOGIC NEGATIVE: 1
HEMATOLOGIC/LYMPHATIC NEGATIVE: 1
PSYCHIATRIC NEGATIVE: 1
SHORTNESS OF BREATH: 1
ENDOCRINE NEGATIVE: 1
NEUROLOGICAL NEGATIVE: 1

## 2025-01-28 NOTE — PROGRESS NOTES
History Of Present Illness  Jessica Umaña is a 74 y.o. female presenting with pulmonary arterial hypertension follow up. Patient is NYHA Functional Class 2+ and WHO Group 1. She was last seen in clinic 1/8/2025. Initially referred to our office by Dr. Heidi Hernandez in 2017.     PAH Treatment:  Opsumit (9/13/2017)  Tadalafil (12/1/2018)  Oxygen, 5-6 LPM w/ slow ambulation   Infusion site: N/A  Treatment history:   Adcirca (9/21/2017-12/1/2018) switch to generic    Interval History   No changes since last visit. Wearing 4 L NC at home and 5L NC with activity. No problems at cath site.    Virtual visit to discuss cath results. Virtual or Telephone Consent  Virtual or Telephone Consent    An interactive audio and video telecommunication system which permits real time communications between the patient (at the originating site) and provider (at the distant site) was utilized to provide this telehealth service.   Verbal consent was requested and obtained from Jessica Umaña on this date, 02/10/25 for a telehealth visit.       Past Medical History  Patient Active Problem List   Diagnosis    CAD (coronary artery disease)    Pulmonary hypertension (Multi)        Surgical History  She has a past surgical history that includes Hysterectomy (06/07/2017) and Cardiac catheterization (N/A, 7/10/2024).     Social History  She reports that she has never smoked. She has never used smokeless tobacco. She reports that she does not currently use alcohol. She reports that she does not currently use drugs.    Family History  No family history on file.     Medications  Current Outpatient Medications:     ascorbic acid (Vitamin C) 250 mg tablet, Take 1 tablet (250 mg) by mouth once daily., Disp: , Rfl:     aspirin 81 mg EC tablet, Take 1 tablet (81 mg) by mouth once daily., Disp: , Rfl:     calcium carbonate-vitamin D3 500 mg-5 mcg (200 unit) tablet, Take 2 tablets by mouth once daily., Disp: , Rfl:     clopidogrel (Plavix) 75 mg tablet, Take  1 tablet (75 mg) by mouth once daily., Disp: 90 tablet, Rfl: 2    cyanocobalamin (Vitamin B-12) 500 mcg tablet, Take 1 tablet (500 mcg) by mouth once daily., Disp: , Rfl:     fluticasone (Flonase) 50 mcg/actuation nasal spray, Administer into affected nostril(s) once daily. (Patient taking differently: Administer 1 spray into each nostril once daily as needed.), Disp: , Rfl:     loratadine (Claritin) 10 mg tablet, Take 1 tablet (10 mg) by mouth once daily as needed., Disp: , Rfl:     macitentan (Opsumit) 10 mg tablet tablet, TAKE 1 TABLET BY MOUTH DAILY, Disp: 30 tablet, Rfl: 11    mv-calcium-min-iron fm-FA-vitK (Multi For Her) 18 mg iron-600 mcg-80 mcg tablet, Take 1 tablet by mouth once daily., Disp: , Rfl:     pantoprazole (ProtoNix) 40 mg EC tablet, TAKE 1 TABLET EVERY DAY, Disp: 90 tablet, Rfl: 3    pravastatin (Pravachol) 40 mg tablet, TAKE 1 TABLET AT BEDTIME, Disp: 90 tablet, Rfl: 3    pseudoephedrine (Sudafed) 30 mg tablet, Take 2 tablets (60 mg) by mouth every 6 hours if needed., Disp: , Rfl:     spironolactone (Aldactone) 25 mg tablet, Take 1 tablet (25 mg) by mouth once daily., Disp: 30 tablet, Rfl: 11    tadalafil 20 mg tablet, Take 1 tablet (20 mg) by mouth once daily. (Patient taking differently: Take 2 tablets (40 mg) by mouth once daily.), Disp: , Rfl:      Allergies  Acetaminophen and Penicillins    Review of Systems   Constitutional: Negative.  Negative for fatigue.   HENT: Negative.     Eyes: Negative.    Respiratory:  Positive for shortness of breath.    Cardiovascular: Negative.  Negative for leg swelling.   Gastrointestinal: Negative.    Endocrine: Negative.    Genitourinary: Negative.    Musculoskeletal: Negative.    Skin: Negative.    Allergic/Immunologic: Negative.    Neurological: Negative.  Negative for dizziness, syncope and light-headedness.   Hematological: Negative.    Psychiatric/Behavioral: Negative.         Last Recorded Vitals  There were no vitals taken for this visit. Virtual  visit.     Exam - No exam due to virtual visit.  Relevant Results    RHC (2/5/2025)  PAP- 25mm Hg  PWP-8  CO/CI-6.8/3.8  PVR- 200 Dynes·sec·cm??    6MWT (1/8/2025)   SP02- 99-96% on 6 LNC  HR-  FAM-0-7  Actual Meters- 435m (109% predicted)    Echo (1/8/2025)  Left Ventricle: Left ventricular ejection fraction is mildly decreased, calculated by Smith's biplane at 45%. There are no regional left ventricular wall motion abnormalities. The left ventricular cavity size is normal. There is normal septal and normal posterior left ventricular wall thickness. The interventricular septum is flattened in systole and diastole, consistent with right ventricular pressure and volume overload. Spectral Doppler shows a Grade I (impaired relaxation pattern) of left ventricular diastolic filling with normal left atrial filling pressure.  Left Atrium: The left atrium is normal in size.  Right Ventricle: The right ventricle is moderately enlarged. There is mildly reduced right ventricular systolic function.  Right Atrium: The right atrium is normal in size.  Aortic Valve: The aortic valve is trileaflet. The aortic valve dimensionless index is 0.85. There is no evidence of aortic valve regurgitation. The peak instantaneous gradient of the aortic valve is 6 mmHg. The mean gradient of the aortic valve is 4 mmHg.  Mitral Valve: The mitral valve is normal in structure. There is trace mitral valve regurgitation.  Tricuspid Valve: The tricuspid valve is structurally normal. There is trace tricuspid regurgitation. The Doppler estimated RVSP is within normal limits at 16.5 mmHg.  Pulmonic Valve: The pulmonic valve is structurally normal. There is physiologic pulmonic valve regurgitation.  Pericardium: There is no pericardial effusion noted.  Aorta: The aortic root is normal. There is no dilatation of the ascending aorta.  Systemic Veins: The inferior vena cava appears normal in size, with IVC inspiratory collapse less than 50%.      CONCLUSIONS:   1. Left ventricular ejection fraction is mildly decreased, calculated by Smith's biplane at 45%.   2. Spectral Doppler shows a Grade I (impaired relaxation pattern) of left ventricular diastolic filling with normal left atrial filling pressure.   3. Right ventricular volume and pressure overload.   4. There is mildly reduced right ventricular systolic function.   5. Moderately enlarged right ventricle.   6. Right ventricular systolic pressure is within normal limits.    6MWT (2024)  VC69-899-90% on 6 LPM NC  HR-  MORE-0-7  Actual Meters- 428m     RHC (7/10/2024)  PAP-52/23/(32)   PWP-15  CO/CI-3.2/5.9  EDP - 12     6MWT (2024)  SpO2: 99/87 6L  HR: 55/129  More: 0/6  Actual meters: 459     Echo (2024)   Left Ventricle: The left ventricular systolic function is normal, with an estimated ejection fraction of 55-60%. The calculated ejection fraction is normal at 58 % using the Smith's Bi-plane MOD calculation. The left ventricular cavity size is normal. Spectral Doppler shows an impaired relaxation pattern of left ventricular diastolic filling.  Left Atrium: The left atrium is normal in size.  Right Ventricle: The right ventricle is mildly enlarged. There is normal right ventricular global systolic function.  Right Atrium: The right atrium is normal in size.  Aortic Valve: The aortic valve is trileaflet. There is trivial aortic valve regurgitation. The peak instantaneous gradient of the aortic valve is 7.0 mmHg.  Mitral Valve: The mitral valve is normal in structure. There is trace mitral valve regurgitation.  Tricuspid Valve: The tricuspid valve is structurally normal. There is trace tricuspid regurgitation.  Pulmonic Valve: The pulmonic valve is structurally normal. There is physiologic pulmonic valve regurgitation.  Pericardium: There is no pericardial effusion noted.  Aorta: The aortic root is normal.  In comparison to the previous echocardiogram(s): RV size and function and  "LV function comparable to the prior echocardiogram from 2023.  CONCLUSIONS:   1. Left ventricular systolic function is normal with a 55-60% estimated ejection fraction.   2. Spectral Doppler shows an impaired relaxation pattern of left ventricular diastolic filling.     6MWT (2024)  SpO2: 95-84% 6LPM  HR:   More: 0-6  Actual meters: 448     6MWT (2023)  SpO2: 97-88% 6LPM  HR:   More: 0-4  Actual meters: 442     6MWT (2023)  SpO2: 97-85% 6LPM  HR:   More: 0-5  Actual meters: 454     6MWT (2023)  HR 64 - 125  Spo2 99 - 88 on 6LPM  More 0 - 5  Actual 428m / 393m predicted      Echo (2023)   The right ventricle is mildly enlarged. There is low normal right ventricular systolic function.  The right atrium is normal in size.     Echo (2023)  normal size RV with normal function,   normal size RA     RHC (2021)  PAP 41/16 (26)  PW 9  C.O. 6.5 / C.I. 3.5     Assessment/Plan     1) Pulmonary   a. IPAH FC i - II on dual oral therapies. and improving and significant objective and subjective improvement. Walk distance Well over presentation and stable. Last echo  today with suboptimal RV views but likely unchaged from last..Cath 2018, mPAP =27 mm Hg. Feels \"wonderful\". Echo 2020 normal RV by my eye. Due for cath this year. Cath 2021 mPAP = 26 mm Hg. Echo 2023 with \"normal RV\", I disagree with that, at least mild + but stable, echo 2023 same. SpO2 on room air rest = 98%     2023 - Walk (+) 26m, desat to 85% on 6L/min     2024 - Walk stable , no change, no interval medical issues.    2024 echo RV still > normal by my eye. Walk and subjective function stable.    2024- 6MW down some in the setting of leg injury.    2025 - RV appears under more pressure by my eye. Patient unchanged    2/10/2025 - cath with PVR = 2.25 CHAVEZ. At goal.     2) (+) GLADYS without evidence of systemic disease.     3) Endocrine  a. diabetes  b. Obesity , " BMI =33.4 -34.9 -> 33.29      4) Cardiac - stented CAD with some benefit.    Plan    1) Oxygen at 5-6L/min with slow ambulation. Continue sleep O2, no oxygen needed awake at rest.  2) Continue macitentan and Adcirca.  3) Routine follow up in approximately 12 weeks with 6MW.    Discussed cath with very near normal PVR with patient and son by virtual visit. No need for additional medication at this time. Echo and cath are consistent. 15 minute virtual visit.

## 2025-01-30 LAB
ANION GAP SERPL CALCULATED.4IONS-SCNC: 9 MMOL/L (CALC) (ref 7–17)
BUN SERPL-MCNC: 13 MG/DL (ref 7–25)
BUN/CREAT SERPL: NORMAL (CALC) (ref 6–22)
CALCIUM SERPL-MCNC: 9.6 MG/DL (ref 8.6–10.4)
CHLORIDE SERPL-SCNC: 105 MMOL/L (ref 98–110)
CO2 SERPL-SCNC: 25 MMOL/L (ref 20–32)
CREAT SERPL-MCNC: 0.82 MG/DL (ref 0.6–1)
EGFRCR SERPLBLD CKD-EPI 2021: 75 ML/MIN/1.73M2
GLUCOSE SERPL-MCNC: 82 MG/DL (ref 65–99)
POTASSIUM SERPL-SCNC: 3.7 MMOL/L (ref 3.5–5.3)
SODIUM SERPL-SCNC: 139 MMOL/L (ref 135–146)

## 2025-02-05 ENCOUNTER — APPOINTMENT (OUTPATIENT)
Dept: PULMONOLOGY | Facility: HOSPITAL | Age: 75
End: 2025-02-05
Payer: MEDICARE

## 2025-02-05 ENCOUNTER — HOSPITAL ENCOUNTER (OUTPATIENT)
Facility: HOSPITAL | Age: 75
Setting detail: OUTPATIENT SURGERY
Discharge: HOME | End: 2025-02-05
Attending: INTERNAL MEDICINE | Admitting: INTERNAL MEDICINE
Payer: MEDICARE

## 2025-02-05 VITALS
BODY MASS INDEX: 31.47 KG/M2 | SYSTOLIC BLOOD PRESSURE: 113 MMHG | HEIGHT: 62 IN | OXYGEN SATURATION: 100 % | HEART RATE: 52 BPM | WEIGHT: 171 LBS | DIASTOLIC BLOOD PRESSURE: 65 MMHG

## 2025-02-05 DIAGNOSIS — I27.20 PULMONARY HYPERTENSION (MULTI): ICD-10-CM

## 2025-02-05 DIAGNOSIS — I27.0 PRIMARY PULMONARY HYPERTENSION (MULTI): ICD-10-CM

## 2025-02-05 PROCEDURE — 2720000007 HC OR 272 NO HCPCS: Performed by: INTERNAL MEDICINE

## 2025-02-05 PROCEDURE — 93451 RIGHT HEART CATH: CPT | Performed by: INTERNAL MEDICINE

## 2025-02-05 PROCEDURE — 7100000010 HC PHASE TWO TIME - EACH INCREMENTAL 1 MINUTE: Performed by: INTERNAL MEDICINE

## 2025-02-05 PROCEDURE — 2500000004 HC RX 250 GENERAL PHARMACY W/ HCPCS (ALT 636 FOR OP/ED): Performed by: INTERNAL MEDICINE

## 2025-02-05 PROCEDURE — C1727 CATH, BAL TIS DIS, NON-VAS: HCPCS | Performed by: INTERNAL MEDICINE

## 2025-02-05 PROCEDURE — 76937 US GUIDE VASCULAR ACCESS: CPT | Performed by: INTERNAL MEDICINE

## 2025-02-05 PROCEDURE — 99152 MOD SED SAME PHYS/QHP 5/>YRS: CPT | Performed by: INTERNAL MEDICINE

## 2025-02-05 PROCEDURE — C1894 INTRO/SHEATH, NON-LASER: HCPCS | Performed by: INTERNAL MEDICINE

## 2025-02-05 PROCEDURE — 7100000009 HC PHASE TWO TIME - INITIAL BASE CHARGE: Performed by: INTERNAL MEDICINE

## 2025-02-05 RX ORDER — FENTANYL CITRATE 50 UG/ML
INJECTION, SOLUTION INTRAMUSCULAR; INTRAVENOUS AS NEEDED
Status: DISCONTINUED | OUTPATIENT
Start: 2025-02-05 | End: 2025-02-05 | Stop reason: HOSPADM

## 2025-02-05 RX ORDER — MIDAZOLAM HYDROCHLORIDE 1 MG/ML
INJECTION, SOLUTION INTRAMUSCULAR; INTRAVENOUS AS NEEDED
Status: DISCONTINUED | OUTPATIENT
Start: 2025-02-05 | End: 2025-02-05 | Stop reason: HOSPADM

## 2025-02-05 RX ORDER — LIDOCAINE HYDROCHLORIDE 10 MG/ML
INJECTION, SOLUTION EPIDURAL; INFILTRATION; INTRACAUDAL; PERINEURAL AS NEEDED
Status: DISCONTINUED | OUTPATIENT
Start: 2025-02-05 | End: 2025-02-05 | Stop reason: HOSPADM

## 2025-02-05 NOTE — POST-PROCEDURE NOTE
Physician Transition of Care Summary  Invasive Cardiovascular Lab    Procedure Date: 2/5/2025  Attending:    * Chico Bowling - Primary  Resident/Fellow/Other Assistant: Surgeons and Role:  * No surgeons found with a matching role *    Indications:   Pre-op Diagnosis      * Pulmonary hypertension (Multi) [I27.20]    Post-procedure diagnosis:   Post-op Diagnosis     * Pulmonary hypertension (Multi) [I27.20]    Procedure(s):   Right Heart Cath  95[address] - [place] RIGHT HEART CATH O2 SATURATION & CARDIAC OUTPUT        Procedure Findings:   Preserved CO/CI with normal filling pressures    Description of the Procedure:   RHC via RIJ    Complications:   None      Estimated Blood Loss:   0 mL    Anesthesia: Moderate Sedation Anesthesia Staff: No anesthesia staff entered.    Any Specimen(s) Removed:   No specimens collected during this procedure.    Disposition:   Follow up with Dr. Mcbride in [facility] clinic      Electronically signed by: Chico Bowling DO, 2/5/2025 8:16 AM

## 2025-02-05 NOTE — H&P
"History Of Present Illness   74 y.o. female presenting for RHC for PAH monitoring on therapy.   Past Medical History  Past Medical History:   Diagnosis Date    Personal history of other diseases of the circulatory system 06/07/2017    History of diastolic dysfunction    Personal history of other diseases of the female genital tract 06/07/2017    History of endometriosis       Surgical History  Past Surgical History:   Procedure Laterality Date    CARDIAC CATHETERIZATION N/A 7/10/2024    Procedure: Right Heart Cath;  Surgeon: Chico Bowling DO;  Location: [place] [address] Cardiac Cath Lab;  Service: Cardiovascular;  Laterality: N/A;    HYSTERECTOMY  06/07/2017    Hysterectomy        Social History  She reports that she has never smoked. She has never used smokeless tobacco. She reports that she does not currently use alcohol. She reports that she does not currently use drugs.    Family History  No family history on file.     Allergies  Acetaminophen and Penicillins    Review of Systems     Physical Exam  Constitutional:       Appearance: Normal appearance.   HENT:      Head: Normocephalic and atraumatic.   Cardiovascular:      Rate and Rhythm: Normal rate.      Heart sounds:      No friction rub. No gallop.   Pulmonary:      Effort: Pulmonary effort is normal. No respiratory distress.      Breath sounds: No wheezing, rhonchi or rales.   Abdominal:      General: There is no distension.      Palpations: Abdomen is soft.      Tenderness: There is no abdominal tenderness. There is no guarding.   Musculoskeletal:      Right lower leg: No edema.      Left lower leg: No edema.   Skin:     General: Skin is warm.   Neurological:      Mental Status: She is alert. Mental status is at baseline.   Psychiatric:         Thought Content: Thought content normal.          Last Recorded Vitals  Height 1.575 m (5' 2\"), weight 77.6 kg (171 lb).    Relevant Results             Assessment/Plan   Assessment & Plan  Pulmonary hypertension " (Multi)      74 F with PAH here for post therapy ongoing surveillance RHC.      Ivory Ventura MD

## 2025-02-05 NOTE — DISCHARGE INSTRUCTIONS
Right Heart Catheterization Instructions  The right side of the heart receives blood from the body and pumps it to the lungs. The blood picks up oxygen in the lungs. A right heart catheterization (also called pulmonary artery catheterization) tests the blood pressure and oxygen levels in your lungs and heart. It also checks to see how well your heart is pumping.    Your doctor put a thin, flexible tube (catheter) into a blood vessel in your neck, groin, or arm. During the test, the doctor moved the catheter through the blood vessel into your heart. A small balloon on the tip of the catheter helped guide it into the artery that carries blood to your lungs (pulmonary artery).    You may have swelling, bruising, or a small lump around the site where the catheter went into your body. You can do light activities around the house. But don't do anything strenuous until your doctor says it is okay. This lets the catheter site heal.    This care sheet gives you a general idea about how long it will take for you to recover. But each person recovers at a different pace. Follow the steps below to get better as quickly as possible.    Activity  If the doctor gave you a sedative:  -For 24 hours, don't do anything that requires attention to detail, such as going to work, making important decisions, or signing any legal documents. It takes time for the medicine's effects to completely wear off.  -For your safety, do not drive or operate any machinery that could be dangerous. Wait until the medicine wears off and you can think clearly and react easily.  -Do not do strenuous exercise and do not lift, pull, or push anything heavy until your doctor says it is okay. This may be for a couple of days. This lets the catheter site heal. You can walk around the house and do light activity, such as cooking.    Diet  -You can eat your normal diet. If your stomach is upset, try bland, low-fat foods like plain rice, broiled chicken, toast, and  yogurt.    Medicines  - Resume your medications as scheduled    Care of the catheter site  -For 1 or 2 days, keep the bandage over the spot where the catheter was inserted. The bandage probably will fall off in this time.  -Put ice or a cold pack on the area for 10 to 20 minutes at a time to help with soreness or swelling. Put a thin cloth between the ice and your skin.  -You may shower 24 to 48 hours after the procedure, if your doctor okays it. Pat the incision dry.  -Do not soak the catheter site until it is healed. Don't take a bath for 1 week, or until your doctor tells you it is okay.  -Watch for bleeding from the site. A small amount of blood (up to the size of a quarter) on the bandage can be normal.  -If you are bleeding, lie down and press on the area for 15 minutes to try to make it stop. If the bleeding doesn't stop, call your doctor or nurse advice line or seek immediate medical care.

## 2025-02-10 ENCOUNTER — TELEMEDICINE (OUTPATIENT)
Dept: PULMONOLOGY | Facility: HOSPITAL | Age: 75
End: 2025-02-10
Payer: MEDICARE

## 2025-02-10 DIAGNOSIS — I27.20 PULMONARY HYPERTENSION (MULTI): ICD-10-CM

## 2025-02-10 DIAGNOSIS — R06.02 SOB (SHORTNESS OF BREATH): ICD-10-CM

## 2025-02-10 DIAGNOSIS — Z79.899 LONG-TERM USE OF HIGH-RISK MEDICATION: ICD-10-CM

## 2025-02-10 PROCEDURE — 99213 OFFICE O/P EST LOW 20 MIN: CPT | Performed by: INTERNAL MEDICINE

## 2025-02-10 ASSESSMENT — ENCOUNTER SYMPTOMS
LIGHT-HEADEDNESS: 0
DIZZINESS: 0
FATIGUE: 0

## 2025-02-11 ENCOUNTER — DOCUMENTATION (OUTPATIENT)
Dept: PULMONOLOGY | Facility: HOSPITAL | Age: 75
End: 2025-02-11
Payer: MEDICARE

## 2025-02-11 NOTE — PROGRESS NOTES
RN called to follow up discussion of changing her Opsumit and Tadalafil to the combination pill, Opsynvi. Patient is not interested at this time.

## 2025-03-17 ENCOUNTER — DOCUMENTATION (OUTPATIENT)
Dept: PULMONOLOGY | Facility: HOSPITAL | Age: 75
End: 2025-03-17
Payer: MEDICARE

## 2025-03-24 ENCOUNTER — TELEPHONE (OUTPATIENT)
Dept: PULMONOLOGY | Facility: HOSPITAL | Age: 75
End: 2025-03-24
Payer: MEDICARE

## 2025-05-08 ASSESSMENT — ENCOUNTER SYMPTOMS
PSYCHIATRIC NEGATIVE: 1
GASTROINTESTINAL NEGATIVE: 1
ENDOCRINE NEGATIVE: 1
MUSCULOSKELETAL NEGATIVE: 1
ALLERGIC/IMMUNOLOGIC NEGATIVE: 1
HEMATOLOGIC/LYMPHATIC NEGATIVE: 1
NEUROLOGICAL NEGATIVE: 1
SHORTNESS OF BREATH: 1
EYES NEGATIVE: 1
CARDIOVASCULAR NEGATIVE: 1
CONSTITUTIONAL NEGATIVE: 1

## 2025-05-08 NOTE — PROGRESS NOTES
History Of Present Illness  Jessica Umaña is a 75 y.o. female presenting with pulmonary arterial hypertension follow up. Patient is NYHA Functional Class 2+ and WHO Group 1. She was last visit was virtual on 2/10/2025. Initially referred to our office by Dr. Heidi Hernandez in 2017.     PAH Treatment:  Opsynvi (3/27/2025)  Oxygen, 4L at rest, 5-6 LPM w/ slow ambulation   Infusion site: N/A  Treatment history:   Adcirca (9/21/2017-12/1/2018) switch to generic  Opsumit (9/13/2017-3/27/2025) switched to Opsynvi  Tadalafil (12/1/2018-3/27/2025) switched to Opsynvi    Today's testing includes 6 MWT and Labs    Interval History   Patient reports breathing is the same since last visit. Had some concentrator problems that have been fixed. Was sleeping with her POC for a few days. Wearing 4L continuous at home and using setting of 5 on POC. Started Opsynvi about 6 weeks ago, doing well. Feels like she is sleeping better after starting new medication. No edema noted.       Past Medical History  Patient Active Problem List   Diagnosis    CAD (coronary artery disease)    Pulmonary hypertension (Multi)        Surgical History  She has a past surgical history that includes Hysterectomy (06/07/2017); Cardiac catheterization (N/A, 7/10/2024); and Cardiac catheterization (N/A, 2/5/2025).     Social History  She reports that she has never smoked. She has never used smokeless tobacco. She reports that she does not currently use alcohol. She reports that she does not currently use drugs.    Family History  No family history on file.     Medications  Current Outpatient Medications:     ascorbic acid (Vitamin C) 250 mg tablet, Take 1 tablet (250 mg) by mouth once daily., Disp: , Rfl:     aspirin 81 mg EC tablet, Take 1 tablet (81 mg) by mouth once daily., Disp: , Rfl:     calcium carbonate-vitamin D3 500 mg-5 mcg (200 unit) tablet, Take 2 tablets by mouth once daily., Disp: , Rfl:     clopidogrel (Plavix) 75 mg tablet, Take 1 tablet (75 mg)  by mouth once daily., Disp: 90 tablet, Rfl: 2    cyanocobalamin (Vitamin B-12) 500 mcg tablet, Take 1 tablet (500 mcg) by mouth once daily., Disp: , Rfl:     fluticasone (Flonase) 50 mcg/actuation nasal spray, Administer into affected nostril(s) once daily., Disp: , Rfl:     loratadine (Claritin) 10 mg tablet, Take 1 tablet (10 mg) by mouth once daily as needed., Disp: , Rfl:     macitentan-tadalafil (Opsynvi) 10-40 mg tablet, Take 10-40 mg by mouth once daily., Disp: , Rfl:     mv-calcium-min-iron fm-FA-vitK (Multi For Her) 18 mg iron-600 mcg-80 mcg tablet, Take 1 tablet by mouth once daily., Disp: , Rfl:     pantoprazole (ProtoNix) 40 mg EC tablet, TAKE 1 TABLET EVERY DAY, Disp: 90 tablet, Rfl: 3    pravastatin (Pravachol) 40 mg tablet, TAKE 1 TABLET AT BEDTIME, Disp: 90 tablet, Rfl: 3    pseudoephedrine (Sudafed) 30 mg tablet, Take 2 tablets (60 mg) by mouth every 6 hours if needed., Disp: , Rfl:     spironolactone (Aldactone) 25 mg tablet, Take 1 tablet (25 mg) by mouth once daily., Disp: 30 tablet, Rfl: 11    macitentan (Opsumit) 10 mg tablet tablet, TAKE 1 TABLET BY MOUTH DAILY, Disp: 30 tablet, Rfl: 11    tadalafil 20 mg tablet, Take 1 tablet (20 mg) by mouth once daily., Disp: , Rfl:      Allergies  Acetaminophen and Penicillins    Review of Systems   Constitutional: Negative.    HENT: Negative.     Eyes: Negative.    Respiratory:  Positive for shortness of breath and wheezing. Negative for chest tightness.    Cardiovascular: Negative.  Negative for chest pain and palpitations.   Gastrointestinal: Negative.    Endocrine: Negative.    Genitourinary: Negative.    Musculoskeletal: Negative.    Skin: Negative.    Allergic/Immunologic: Negative.    Neurological: Negative.  Negative for dizziness, light-headedness and headaches.   Hematological: Negative.    Psychiatric/Behavioral: Negative.         Last Recorded Vitals  Blood pressure (!) 121/37, pulse 57, weight 79.4 kg (175 lb), SpO2 98%.       Physical  Exam  Constitutional:       Comments: Wheelchair oxygen   HENT:      Head: Normocephalic.      Nose: Nose normal.   Eyes:      Pupils: Pupils are equal, round, and reactive to light.   Neck:      Comments: No JVD  Cardiovascular:      Rate and Rhythm: Normal rate and regular rhythm.      Heart sounds: Murmur heard.   Pulmonary:      Effort: Pulmonary effort is normal.      Breath sounds: Normal breath sounds.   Abdominal:      General: Bowel sounds are normal. There is no distension.      Palpations: Abdomen is soft.   Musculoskeletal:      Right lower leg: No edema.      Left lower leg: No edema.   Skin:     Findings: No rash.   Psychiatric:         Mood and Affect: Mood normal.         Judgment: Judgment normal.       Relevant Results    6MWT (5/14/2025)  SP02- 99-81% on 8L NC  HR-   FAM- 0-5  Actual Meters- 435     RHC (2/5/2025)  PAP- 25mm Hg  PWP-8  CO/CI-6.8/3.8  PVR- 200 Dynes·sec·cm??    6MWT (1/8/2025)  SP02- 97-86% on 6LNC  FAM-0-7  Actual Meters- 434 m    Echo (1/8/2025)   Left Ventricle: Left ventricular ejection fraction is mildly decreased, calculated by Smith's biplane at 45%. There are no regional left ventricular wall motion abnormalities. The left ventricular cavity size is normal. There is normal septal and normal posterior left ventricular wall thickness. The interventricular septum is flattened in systole and diastole, consistent with right ventricular pressure and volume overload. Spectral Doppler shows a Grade I (impaired relaxation pattern) of left ventricular diastolic filling with normal left atrial filling pressure.  Left Atrium: The left atrium is normal in size.  Right Ventricle: The right ventricle is moderately enlarged. There is mildly reduced right ventricular systolic function.  Right Atrium: The right atrium is normal in size.  Aortic Valve: The aortic valve is trileaflet. The aortic valve dimensionless index is 0.85. There is no evidence of aortic valve regurgitation.  The peak instantaneous gradient of the aortic valve is 6 mmHg. The mean gradient of the aortic valve is 4 mmHg.  Mitral Valve: The mitral valve is normal in structure. There is trace mitral valve regurgitation.  Tricuspid Valve: The tricuspid valve is structurally normal. There is trace tricuspid regurgitation. The Doppler estimated RVSP is within normal limits at 16.5 mmHg.  Pulmonic Valve: The pulmonic valve is structurally normal. There is physiologic pulmonic valve regurgitation.  Pericardium: There is no pericardial effusion noted.  Aorta: The aortic root is normal. There is no dilatation of the ascending aorta.  Systemic Veins: The inferior vena cava appears normal in size, with IVC inspiratory collapse less than 50%.     CONCLUSIONS:   1. Left ventricular ejection fraction is mildly decreased, calculated by Smith's biplane at 45%.   2. Spectral Doppler shows a Grade I (impaired relaxation pattern) of left ventricular diastolic filling with normal left atrial filling pressure.   3. Right ventricular volume and pressure overload.   4. There is mildly reduced right ventricular systolic function.   5. Moderately enlarged right ventricle.   6. Right ventricular systolic pressure is within normal limits.    6MWT (2024)  RD26-536-93% on 6 LPM NC  HR-  MORE-0-7  Actual Meters- 428m     RHC (7/10/2024)  PAP-52/23/(32)   PWP-15  CO/CI-3.2/5.9  EDP - 12     6MWT (2024)  SpO2: 99/87 6L  HR: 55/129  More: 0/6  Actual meters: 459     Echo (2024)   Left Ventricle: The left ventricular systolic function is normal, with an estimated ejection fraction of 55-60%. The calculated ejection fraction is normal at 58 % using the Smith's Bi-plane MOD calculation. The left ventricular cavity size is normal. Spectral Doppler shows an impaired relaxation pattern of left ventricular diastolic filling.  Left Atrium: The left atrium is normal in size.  Right Ventricle: The right ventricle is mildly enlarged. There  is normal right ventricular global systolic function.  Right Atrium: The right atrium is normal in size.  Aortic Valve: The aortic valve is trileaflet. There is trivial aortic valve regurgitation. The peak instantaneous gradient of the aortic valve is 7.0 mmHg.  Mitral Valve: The mitral valve is normal in structure. There is trace mitral valve regurgitation.  Tricuspid Valve: The tricuspid valve is structurally normal. There is trace tricuspid regurgitation.  Pulmonic Valve: The pulmonic valve is structurally normal. There is physiologic pulmonic valve regurgitation.  Pericardium: There is no pericardial effusion noted.  Aorta: The aortic root is normal.  In comparison to the previous echocardiogram(s): RV size and function and LV function comparable to the prior echocardiogram from 2023.  CONCLUSIONS:   1. Left ventricular systolic function is normal with a 55-60% estimated ejection fraction.   2. Spectral Doppler shows an impaired relaxation pattern of left ventricular diastolic filling.     6MWT (2024)  SpO2: 95-84% 6LPM  HR:   More: 0-6  Actual meters: 448     6MWT (2023)  SpO2: 97-88% 6LPM  HR:   More: 0-4  Actual meters: 442     6MWT (2023)  SpO2: 97-85% 6LPM  HR:   More: 0-5  Actual meters: 454     6MWT (2023)  HR 64 - 125  Spo2 99 - 88 on 6LPM  More 0 - 5  Actual 428m / 393m predicted      Echo (2023)   The right ventricle is mildly enlarged. There is low normal right ventricular systolic function.  The right atrium is normal in size.     Echo (2023)  normal size RV with normal function,   normal size RA     RHC (2021)  PAP 41/16 (26)  PW 9  C.O. 6.5 / C.I. 3.5     Assessment/Plan     1) Pulmonary   a. IPAH FC i - II on dual oral therapies. and improving and significant objective and subjective improvement. Walk distance Well over presentation and stable. Last echo  today with suboptimal RV views but likely unchaged from last. Cath 2018,  "mPAP =27 mm Hg. Feels \"wonderful\". Echo 9/26/2020 normal RV by my eye. Due for cath this year. Cath 6/2021 mPAP = 26 mm Hg. Echo Feb 2023 with \"normal RV\", I disagree with that, at least mild + but stable, echo 8/23/2023 same. SpO2 on room air rest = 98%      5/17/2023 - Walk (+) 26m, desat to 85% on 6L/min     1/17/2024 - Walk stable , no change, no interval medical issues.     4/25/2024 echo RV still > normal by my eye. Walk and subjective function stable.     9/25/2024- 6MW down some in the setting of leg injury.     1/9/2025 - RV appears under more pressure by my eye. Patient unchanged     2/10/2025 - cath with PVR = 2.25 CHAVEZ. At goal.     2) (+) GLADYS without evidence of systemic disease.     3) Endocrine  a. diabetes  b. Obesity , BMI =33.4 -34.9 -> 33.29      4) Cardiac - stented CAD with some benefit.    Plan    1) Oxygen at 5-6L/min with slow ambulation. Continue sleep O2, no oxygen needed awake at rest.  2) Continue macitentan and Adcirca.  3) Routine follow up in approximately 18 weeks with 6MW,echo.            "

## 2025-05-10 DIAGNOSIS — I27.20 PULMONARY HYPERTENSION (MULTI): ICD-10-CM

## 2025-05-10 DIAGNOSIS — R06.02 SOB (SHORTNESS OF BREATH): ICD-10-CM

## 2025-05-10 DIAGNOSIS — Z79.899 LONG-TERM USE OF HIGH-RISK MEDICATION: ICD-10-CM

## 2025-05-12 ENCOUNTER — APPOINTMENT (OUTPATIENT)
Dept: PULMONOLOGY | Facility: HOSPITAL | Age: 75
End: 2025-05-12
Payer: MEDICARE

## 2025-05-12 ENCOUNTER — APPOINTMENT (OUTPATIENT)
Dept: RESPIRATORY THERAPY | Facility: HOSPITAL | Age: 75
End: 2025-05-12
Payer: MEDICARE

## 2025-05-14 ENCOUNTER — OFFICE VISIT (OUTPATIENT)
Dept: PULMONOLOGY | Facility: HOSPITAL | Age: 75
End: 2025-05-14
Payer: MEDICARE

## 2025-05-14 ENCOUNTER — HOSPITAL ENCOUNTER (OUTPATIENT)
Dept: RESPIRATORY THERAPY | Facility: HOSPITAL | Age: 75
Discharge: HOME | End: 2025-05-14
Payer: MEDICARE

## 2025-05-14 VITALS
HEART RATE: 57 BPM | WEIGHT: 175 LBS | DIASTOLIC BLOOD PRESSURE: 66 MMHG | BODY MASS INDEX: 32.01 KG/M2 | SYSTOLIC BLOOD PRESSURE: 142 MMHG | OXYGEN SATURATION: 98 %

## 2025-05-14 DIAGNOSIS — I27.20 PULMONARY HYPERTENSION (MULTI): ICD-10-CM

## 2025-05-14 DIAGNOSIS — Z79.899 LONG-TERM USE OF HIGH-RISK MEDICATION: ICD-10-CM

## 2025-05-14 DIAGNOSIS — R06.02 SOB (SHORTNESS OF BREATH): ICD-10-CM

## 2025-05-14 PROCEDURE — 99215 OFFICE O/P EST HI 40 MIN: CPT | Performed by: INTERNAL MEDICINE

## 2025-05-14 PROCEDURE — 1159F MED LIST DOCD IN RCRD: CPT | Performed by: INTERNAL MEDICINE

## 2025-05-14 PROCEDURE — 94618 PULMONARY STRESS TESTING: CPT

## 2025-05-14 RX ORDER — MACITENTAN AND TADALAFIL 10; 40 MG/1; MG/1
10-40 TABLET, FILM COATED ORAL DAILY
COMMUNITY

## 2025-05-14 ASSESSMENT — ENCOUNTER SYMPTOMS
DIZZINESS: 0
OCCASIONAL FEELINGS OF UNSTEADINESS: 0
LIGHT-HEADEDNESS: 0
WHEEZING: 1
HEADACHES: 0
PALPITATIONS: 0
LOSS OF SENSATION IN FEET: 0
DEPRESSION: 0
CHEST TIGHTNESS: 0

## 2025-05-15 LAB
ALBUMIN SERPL-MCNC: 4.4 G/DL (ref 3.6–5.1)
ALP SERPL-CCNC: 49 U/L (ref 37–153)
ALT SERPL-CCNC: 10 U/L (ref 6–29)
ANION GAP SERPL CALCULATED.4IONS-SCNC: 7 MMOL/L (CALC) (ref 7–17)
AST SERPL-CCNC: 16 U/L (ref 10–35)
BILIRUB SERPL-MCNC: 0.4 MG/DL (ref 0.2–1.2)
BNP SERPL-MCNC: 21 PG/ML
BUN SERPL-MCNC: 13 MG/DL (ref 7–25)
CALCIUM SERPL-MCNC: 9.5 MG/DL (ref 8.6–10.4)
CHLORIDE SERPL-SCNC: 106 MMOL/L (ref 98–110)
CO2 SERPL-SCNC: 27 MMOL/L (ref 20–32)
CREAT SERPL-MCNC: 0.96 MG/DL (ref 0.6–1)
EGFRCR SERPLBLD CKD-EPI 2021: 62 ML/MIN/1.73M2
ERYTHROCYTE [DISTWIDTH] IN BLOOD BY AUTOMATED COUNT: 13.9 % (ref 11–15)
GLUCOSE SERPL-MCNC: 86 MG/DL (ref 65–99)
HCT VFR BLD AUTO: 39.6 % (ref 35–45)
HGB BLD-MCNC: 12.8 G/DL (ref 11.7–15.5)
MAGNESIUM SERPL-MCNC: 2.2 MG/DL (ref 1.5–2.5)
MCH RBC QN AUTO: 29.7 PG (ref 27–33)
MCHC RBC AUTO-ENTMCNC: 32.3 G/DL (ref 32–36)
MCV RBC AUTO: 91.9 FL (ref 80–100)
PLATELET # BLD AUTO: 229 THOUSAND/UL (ref 140–400)
PMV BLD REES-ECKER: 10.8 FL (ref 7.5–12.5)
POTASSIUM SERPL-SCNC: 4 MMOL/L (ref 3.5–5.3)
PROT SERPL-MCNC: 7.6 G/DL (ref 6.1–8.1)
RBC # BLD AUTO: 4.31 MILLION/UL (ref 3.8–5.1)
SODIUM SERPL-SCNC: 140 MMOL/L (ref 135–146)
WBC # BLD AUTO: 7 THOUSAND/UL (ref 3.8–10.8)

## 2025-06-24 ENCOUNTER — OFFICE VISIT (OUTPATIENT)
Dept: CARDIOLOGY | Facility: HOSPITAL | Age: 75
End: 2025-06-24
Payer: MEDICARE

## 2025-06-24 VITALS
HEIGHT: 62 IN | WEIGHT: 173.2 LBS | SYSTOLIC BLOOD PRESSURE: 107 MMHG | HEART RATE: 64 BPM | OXYGEN SATURATION: 98 % | BODY MASS INDEX: 31.87 KG/M2 | DIASTOLIC BLOOD PRESSURE: 68 MMHG

## 2025-06-24 DIAGNOSIS — I25.10 CORONARY ARTERY DISEASE INVOLVING NATIVE HEART, UNSPECIFIED VESSEL OR LESION TYPE, UNSPECIFIED WHETHER ANGINA PRESENT: Primary | ICD-10-CM

## 2025-06-24 PROCEDURE — 1126F AMNT PAIN NOTED NONE PRSNT: CPT | Performed by: INTERNAL MEDICINE

## 2025-06-24 PROCEDURE — 3008F BODY MASS INDEX DOCD: CPT | Performed by: INTERNAL MEDICINE

## 2025-06-24 PROCEDURE — 1159F MED LIST DOCD IN RCRD: CPT | Performed by: INTERNAL MEDICINE

## 2025-06-24 PROCEDURE — 99214 OFFICE O/P EST MOD 30 MIN: CPT | Performed by: INTERNAL MEDICINE

## 2025-06-24 PROCEDURE — 99212 OFFICE O/P EST SF 10 MIN: CPT

## 2025-06-24 PROCEDURE — 93005 ELECTROCARDIOGRAM TRACING: CPT | Performed by: INTERNAL MEDICINE

## 2025-06-24 ASSESSMENT — ENCOUNTER SYMPTOMS
DEPRESSION: 0
OCCASIONAL FEELINGS OF UNSTEADINESS: 0
LOSS OF SENSATION IN FEET: 0

## 2025-06-24 ASSESSMENT — PATIENT HEALTH QUESTIONNAIRE - PHQ9
1. LITTLE INTEREST OR PLEASURE IN DOING THINGS: NOT AT ALL
2. FEELING DOWN, DEPRESSED OR HOPELESS: NOT AT ALL
SUM OF ALL RESPONSES TO PHQ9 QUESTIONS 1 AND 2: 0

## 2025-06-24 ASSESSMENT — PAIN SCALES - GENERAL: PAINLEVEL_OUTOF10: 0-NO PAIN

## 2025-06-24 NOTE — PROGRESS NOTES
Chief Complaint:   Follow up visit      History Of Present Illness:    Jessica Umaña is a 75 y.o. female presenting for a follow up visit for her CAD She has a h/o Idiopathic pulmonary artery hypertension and CAD s/p LAD  PCI 7/28/17.   Her therapy is outlined below ( Taken from Dr Mcbride note)     PAH Treatment:  Opsynvi (3/27/2025)  Oxygen, 4L at rest, 5-6 LPM w/ slow ambulation   Treatment history:   Adcirca (9/21/2017-12/1/2018) switch to generic  Opsumit (9/13/2017-3/27/2025) switched to Opsynvi  Tadalafil (12/1/2018-3/27/2025) switched to Opsynvi    Office visit ( 6/24/2025)   She is doing well -stable oxygen requirement and exertional ability , she wears 4-5 liters of oxygen   She denies having chest pain , palpitations , leg edema , no orthopnea and no PND   Has stable ANGEL   She has seen Dr Mcbride in May 2025      Hospitalizations: Denies     Most recent evaluation     RHC ( February 2025)    1. BP 92/53/(66), RA 7, PA 45/16/(25), PCW 8, sat 73%, CO/CI 6.8/3.8,  dynes, PVR 2.6 CHAVEZ.   2. Preserved CO/CI with nornal filling pressures (improved from last case 6 mo ago).    Echocardiogram ( January 2025)      1. Left ventricular ejection fraction is mildly decreased, calculated by Smith's biplane at 45%.   2. Spectral Doppler shows a Grade I (impaired relaxation pattern) of left ventricular diastolic filling with normal left atrial filling pressure.   3. Right ventricular volume and pressure overload.   4. There is mildly reduced right ventricular systolic function.   5. Moderately enlarged right ventricle.   6. Right ventricular systolic pressure is within normal limits.    Echocardiogram ( April 2024)   1. Left ventricular systolic function is normal with a 55-60% estimated ejection fraction.   2. Spectral Doppler shows an impaired relaxation pattern of left ventricular diastolic filling.             June 2021    1. Normal cardiac output (CO: 6.5 L/min, CI: 3.5 L/min/M2).   2. Normal fillin pressure  (RA pressure 5 mmHG and PW pressure: 9 mmHG).   3. Mild pulmonary hypertension (PA pressure: 41/16 mmHG, mPAP: 26 mmHG).        Hospitalizations: Denies       Last Recorded Vitals   Vitals:    06/24/25 0821   BP: 107/68   Pulse:    SpO2:           Past Medical History:  She has a past medical history of Personal history of other diseases of the circulatory system (06/07/2017) and Personal history of other diseases of the female genital tract (06/07/2017).    Past Surgical History:  She has a past surgical history that includes Hysterectomy (06/07/2017); Cardiac catheterization (N/A, 7/10/2024); and Cardiac catheterization (N/A, 2/5/2025).      Social History:  She reports that she has never smoked. She has never used smokeless tobacco. She reports that she does not currently use alcohol. She reports that she does not currently use drugs.    Family History:  No family history on file.     Allergies:  Acetaminophen and Penicillins    Outpatient Medications:  Current Outpatient Medications   Medication Instructions    ascorbic acid (Vitamin C) 250 mg tablet 1 tablet, Daily    aspirin 81 mg EC tablet 1 tablet, Daily    calcium carbonate-vitamin D3 500 mg-5 mcg (200 unit) tablet 2 tablets, Daily    clopidogrel (PLAVIX) 75 mg, oral, Daily    cyanocobalamin (Vitamin B-12) 500 mcg tablet 1 tablet, Daily    fluticasone (Flonase) 50 mcg/actuation nasal spray Daily RT    loratadine (Claritin) 10 mg tablet 1 tablet, Daily PRN    macitentan-tadalafil (Opsynvi) 10-40 mg tablet 10-40 mg, Daily    mv-calcium-min-iron fm-FA-vitK (Multi For Her) 18 mg iron-600 mcg-80 mcg tablet 1 tablet, Daily    Opsumit 10 mg, oral, Daily    pantoprazole (PROTONIX) 40 mg, oral, Daily    pravastatin (PRAVACHOL) 40 mg, oral, Nightly    pseudoephedrine (SUDAFED) 60 mg, Every 6 hours PRN    spironolactone (ALDACTONE) 25 mg, oral, Daily    tadalafil 20 mg, Daily RT       Physical Exam:  GEN: NAD , AOX3  HEENT : JVP at the clavicle   Heart : regular  rhythm , normal S1 and S2 , no murmurs   Lungs : clear , resonant , normal air entry bilaterally   Abdomen : soft , non tender   Ext: warm , well perfused , no edema   Neuro : grossly intact       Last Labs:  CBC -  Lab Results   Component Value Date    WBC 7.0 05/14/2025    HGB 12.8 05/14/2025    HCT 39.6 05/14/2025    MCV 91.9 05/14/2025     05/14/2025       CMP -  Lab Results   Component Value Date    CALCIUM 9.5 05/14/2025    PHOS 4.0 07/24/2024    PROT 7.6 05/14/2025    ALBUMIN 4.4 05/14/2025    AST 16 05/14/2025    ALT 10 05/14/2025    ALKPHOS 49 05/14/2025    BILITOT 0.4 05/14/2025       LIPID PANEL -   Lab Results   Component Value Date    CHOL 157 04/11/2023    TRIG 102 04/11/2023    HDL 42.7 04/11/2023    CHHDL 3.7 04/11/2023    LDLF 94 04/11/2023    VLDL 20 04/11/2023       RENAL FUNCTION PANEL -   Lab Results   Component Value Date    GLUCOSE 86 05/14/2025     05/14/2025    K 4.0 05/14/2025     05/14/2025    CO2 27 05/14/2025    ANIONGAP 7 05/14/2025    BUN 13 05/14/2025    CREATININE 0.96 05/14/2025    CALCIUM 9.5 05/14/2025    PHOS 4.0 07/24/2024    ALBUMIN 4.4 05/14/2025        Lab Results   Component Value Date    BNP 21 05/14/2025           Assessment/Plan   75 year old woman with a PMH significant for idiopathic PH , also with CAD s/p PCI to an LAD  in 2017 .   She is here for a routine follow up     Euvolemic on examination and clinically stable   Reviewed her RHC numbers from February 2025 - she had normal filling pressures and CO   Her echocardiogram is comparable to prior as well   BP is within normal       Continue ASA and Plavix lifelong per Dr Hillman        Intolerant to statins secondary to significant myalgias       Yearly echocardiograms and cardiology appointments , she has repeat testing       Including echocardiogram ordered by Dr Mcbride for September , hence will see       Her for follow up in October to review

## 2025-06-24 NOTE — PATIENT INSTRUCTIONS
To reach Dr. Mayer's office please call [phone] (Vikki). Fax [phone]. Call [phone] to schedule an appointment. You may also contact the HF RNs at [email or web address]    Thank you for coming to your appointment today. If you have any questions or need cardiac medication refills, please call the Heart Failure Office at [phone] option [phone].     Follow up with Dr. Mayer in October 2025

## 2025-07-02 DIAGNOSIS — E78.5 HYPERLIPIDEMIA, UNSPECIFIED HYPERLIPIDEMIA TYPE: ICD-10-CM

## 2025-07-02 RX ORDER — PRAVASTATIN SODIUM 40 MG/1
40 TABLET ORAL NIGHTLY
Qty: 90 TABLET | Refills: 3 | Status: SHIPPED | OUTPATIENT
Start: 2025-07-02

## 2025-07-06 LAB
ATRIAL RATE: 55 BPM
P AXIS: 45 DEGREES
P OFFSET: 192 MS
P ONSET: 135 MS
PR INTERVAL: 170 MS
Q ONSET: 220 MS
QRS COUNT: 9 BEATS
QRS DURATION: 84 MS
QT INTERVAL: 454 MS
QTC CALCULATION(BAZETT): 434 MS
QTC FREDERICIA: 441 MS
R AXIS: 16 DEGREES
T AXIS: 5 DEGREES
T OFFSET: 447 MS
VENTRICULAR RATE: 55 BPM

## 2025-07-07 DIAGNOSIS — I25.10 CORONARY ARTERY DISEASE INVOLVING NATIVE HEART WITHOUT ANGINA PECTORIS, UNSPECIFIED VESSEL OR LESION TYPE: ICD-10-CM

## 2025-07-07 RX ORDER — CLOPIDOGREL BISULFATE 75 MG/1
75 TABLET ORAL DAILY
Qty: 90 TABLET | Refills: 3 | Status: SHIPPED | OUTPATIENT
Start: 2025-07-07

## 2025-08-14 ENCOUNTER — APPOINTMENT (OUTPATIENT)
Dept: RESPIRATORY THERAPY | Facility: HOSPITAL | Age: 75
End: 2025-08-14
Payer: MEDICARE

## 2025-08-14 ENCOUNTER — APPOINTMENT (OUTPATIENT)
Dept: PULMONOLOGY | Facility: HOSPITAL | Age: 75
End: 2025-08-14
Payer: MEDICARE

## 2025-08-14 ENCOUNTER — APPOINTMENT (OUTPATIENT)
Dept: CARDIOLOGY | Facility: HOSPITAL | Age: 75
End: 2025-08-14
Payer: MEDICARE

## 2025-08-14 DIAGNOSIS — R06.02 SOB (SHORTNESS OF BREATH): ICD-10-CM

## 2025-08-14 DIAGNOSIS — I27.20 PULMONARY HYPERTENSION (MULTI): ICD-10-CM

## 2025-08-14 DIAGNOSIS — Z79.899 LONG-TERM USE OF HIGH-RISK MEDICATION: ICD-10-CM

## 2025-08-19 DIAGNOSIS — I27.20 PULMONARY HYPERTENSION (MULTI): ICD-10-CM

## 2025-08-19 DIAGNOSIS — I50.30 DIASTOLIC HEART FAILURE, UNSPECIFIED HF CHRONICITY: ICD-10-CM

## 2025-08-20 RX ORDER — SPIRONOLACTONE 25 MG/1
25 TABLET ORAL DAILY
Qty: 90 TABLET | Refills: 3 | Status: SHIPPED | OUTPATIENT
Start: 2025-08-20

## (undated) DEVICE — ACCESS KIT, S-MAK MINI, 4FR 10CM 0.018IN 40CM, NT/PT, ECHO ENHANCE NEEDLE

## (undated) DEVICE — PAD, ELECTRODE DEFIB PADPRO ADULT STRL W/ADAPTER

## (undated) DEVICE — CATHETER, THERMODILUTION, SWAN GANZ, HI-SHORE, COATED, W/GUIDEWIRE, 7 FR, 110 CM

## (undated) DEVICE — SHEATH, PINNACLE, 10 CM,  7FR INTRODUCER, 7FR DIA, 2.5 CM DIALATOR